# Patient Record
Sex: FEMALE | Race: WHITE | Employment: OTHER | URBAN - METROPOLITAN AREA
[De-identification: names, ages, dates, MRNs, and addresses within clinical notes are randomized per-mention and may not be internally consistent; named-entity substitution may affect disease eponyms.]

---

## 2017-08-22 ENCOUNTER — TELEPHONE (OUTPATIENT)
Dept: INTERNAL MEDICINE CLINIC | Age: 62
End: 2017-08-22

## 2017-08-22 DIAGNOSIS — H04.123 DRY EYES: Primary | ICD-10-CM

## 2017-08-22 NOTE — TELEPHONE ENCOUNTER
----- Message from Lexi Odom sent at 8/22/2017 10:06 AM EDT -----  Regarding: /Telephone  Pt needs referral for appt that she had on 08/14/2017 for [facility] (P) [phone] and (f) [phone].

## 2017-11-15 ENCOUNTER — HOSPITAL ENCOUNTER (OUTPATIENT)
Dept: MAMMOGRAPHY | Age: 62
Discharge: HOME | End: 2017-11-15
Attending: OBSTETRICS & GYNECOLOGY
Payer: COMMERCIAL

## 2017-11-15 DIAGNOSIS — Z12.39 BREAST CANCER SCREENING: ICD-10-CM

## 2017-11-15 PROCEDURE — 77063 BREAST TOMOSYNTHESIS BI: CPT

## 2017-11-18 NOTE — PROGRESS NOTES
MMG normal.  Please update chart per 1/16 protocol.   Overdue for AE/breast exam?  pls make appt w patient

## 2017-11-28 ENCOUNTER — TELEPHONE (OUTPATIENT)
Dept: OBGYN CLINIC | Age: 62
End: 2017-11-28

## 2017-11-28 NOTE — TELEPHONE ENCOUNTER
LMTCB    ----- Message from Halley Lozano MD sent at 11/18/2017  3:08 PM EST -----  MMG normal.  Please update chart per 1/16 protocol.   Overdue for AE/breast exam?  pls make appt w patient

## 2017-12-05 ENCOUNTER — OFFICE VISIT (OUTPATIENT)
Dept: INTERNAL MEDICINE CLINIC | Age: 62
End: 2017-12-05

## 2017-12-05 VITALS
HEART RATE: 69 BPM | HEIGHT: 64 IN | SYSTOLIC BLOOD PRESSURE: 117 MMHG | WEIGHT: 142.2 LBS | BODY MASS INDEX: 24.28 KG/M2 | RESPIRATION RATE: 14 BRPM | DIASTOLIC BLOOD PRESSURE: 67 MMHG | OXYGEN SATURATION: 99 % | TEMPERATURE: 97.9 F

## 2017-12-05 DIAGNOSIS — Z23 ENCOUNTER FOR IMMUNIZATION: ICD-10-CM

## 2017-12-05 DIAGNOSIS — Z00.00 ROUTINE GENERAL MEDICAL EXAMINATION AT A HEALTH CARE FACILITY: Primary | ICD-10-CM

## 2017-12-05 NOTE — PROGRESS NOTES
HISTORY OF PRESENT ILLNESS  Yuridia Sims is a 60 y.o. female. HPI   Patient reports she followed up for mammogram. She notes last colonoscopy was 2014, followed by Dr. Joseph Moyer. She states she needs to follow up for GYN exam.   Patient continues to participate in exercise with water aerobics. Her youngest son graduated from [employer] and is in [place], oldest son is about to have fifth child ( four girls and one boy) and middle son in [place] and dating someone - the love [place] and going for [employer]   Review of Systems   All other systems reviewed and are negative. Physical Exam   Constitutional: She is oriented to person, place, and time. She appears well-developed and well-nourished. HENT:   Head: Normocephalic and atraumatic. Right Ear: External ear normal.   Left Ear: External ear normal.   Nose: Nose normal.   Mouth/Throat: Oropharynx is clear and moist.   Eyes: Conjunctivae and EOM are normal.   Neck: Normal range of motion. Neck supple. Carotid bruit is not present. No thyroid mass and no thyromegaly present. Cardiovascular: Normal rate, regular rhythm, S1 normal, S2 normal, normal heart sounds and intact distal pulses. Pulmonary/Chest: Effort normal and breath sounds normal.   Abdominal: Soft. Normal appearance and bowel sounds are normal. There is no hepatosplenomegaly. There is no tenderness. Musculoskeletal: Normal range of motion. Neurological: She is alert and oriented to person, place, and time. She has normal strength. No cranial nerve deficit or sensory deficit. Coordination normal.   Skin: Skin is warm, dry and intact. No abrasion and no rash noted. Psychiatric: She has a normal mood and affect. Her behavior is normal. Judgment and thought content normal.   Nursing note and vitals reviewed. ASSESSMENT and PLAN  Diagnoses and all orders for this visit:    1.  Routine general medical examination at a health care facility  -     CBC W/O DIFF  -     METABOLIC PANEL, COMPREHENSIVE  -     LIPID PANEL  -     TSH 3RD GENERATION    2. Encounter for immunization  Administered flu vaccine today in office.    -     Influenza virus vaccine (QUADRIVALENT PRES FREE SYRINGE) IM ([id number])    lab results and schedule of future lab studies reviewed with patient  reviewed diet, exercise and weight control    Written by Cari Villegas, as dictated by Yasmine Valenzuela MD.     Current diagnosis and concerns discussed with pt at length. Understands risks and benefits or current treatment plan and medications and accepts the treatment and medication with any possible risks. Pt asks appropriate questions which were answered. Pt instructed to call with any concerns or problems.

## 2017-12-09 LAB
ALBUMIN SERPL-MCNC: 4.4 G/DL (ref 3.6–4.8)
ALBUMIN/GLOB SERPL: 2.1 {RATIO} (ref 1.2–2.2)
ALP SERPL-CCNC: 56 IU/L (ref 39–117)
ALT SERPL-CCNC: 11 IU/L (ref 0–32)
AST SERPL-CCNC: 19 IU/L (ref 0–40)
BILIRUB SERPL-MCNC: 0.5 MG/DL (ref 0–1.2)
BUN SERPL-MCNC: 16 MG/DL (ref 8–27)
BUN/CREAT SERPL: 20 (ref 12–28)
CALCIUM SERPL-MCNC: 9.4 MG/DL (ref 8.7–10.3)
CHLORIDE SERPL-SCNC: 101 MMOL/L (ref 96–106)
CHOLEST SERPL-MCNC: 224 MG/DL (ref 100–199)
CO2 SERPL-SCNC: 21 MMOL/L (ref 18–29)
CREAT SERPL-MCNC: 0.8 MG/DL (ref 0.57–1)
ERYTHROCYTE [DISTWIDTH] IN BLOOD BY AUTOMATED COUNT: 13.4 % (ref 12.3–15.4)
GFR SERPLBLD CREATININE-BSD FMLA CKD-EPI: 79 ML/MIN/1.73
GFR SERPLBLD CREATININE-BSD FMLA CKD-EPI: 91 ML/MIN/1.73
GLOBULIN SER CALC-MCNC: 2.1 G/DL (ref 1.5–4.5)
GLUCOSE SERPL-MCNC: 84 MG/DL (ref 65–99)
HCT VFR BLD AUTO: 39.4 % (ref 34–46.6)
HDLC SERPL-MCNC: 91 MG/DL
HGB BLD-MCNC: 13.5 G/DL (ref 11.1–15.9)
INTERPRETATION, 910389: NORMAL
LDLC SERPL CALC-MCNC: 122 MG/DL (ref 0–99)
MCH RBC QN AUTO: 29.7 PG (ref 26.6–33)
MCHC RBC AUTO-ENTMCNC: 34.3 G/DL (ref 31.5–35.7)
MCV RBC AUTO: 87 FL (ref 79–97)
PLATELET # BLD AUTO: 182 X10E3/UL (ref 150–379)
POTASSIUM SERPL-SCNC: 4.4 MMOL/L (ref 3.5–5.2)
PROT SERPL-MCNC: 6.5 G/DL (ref 6–8.5)
RBC # BLD AUTO: 4.54 X10E6/UL (ref 3.77–5.28)
SODIUM SERPL-SCNC: 139 MMOL/L (ref 134–144)
TRIGL SERPL-MCNC: 53 MG/DL (ref 0–149)
TSH SERPL DL<=0.005 MIU/L-ACNC: 3.51 UIU/ML (ref 0.45–4.5)
VLDLC SERPL CALC-MCNC: 11 MG/DL (ref 5–40)
WBC # BLD AUTO: 4 X10E3/UL (ref 3.4–10.8)

## 2017-12-11 NOTE — PROGRESS NOTES
Patient reviewed mammo results with PCP. Was advised to contact our office for pelvic exam.  Letter sent to patient.    FS updated

## 2018-01-03 ENCOUNTER — OFFICE VISIT (OUTPATIENT)
Dept: OBGYN CLINIC | Age: 63
End: 2018-01-03

## 2018-01-03 VITALS
WEIGHT: 141.6 LBS | DIASTOLIC BLOOD PRESSURE: 68 MMHG | BODY MASS INDEX: 24.17 KG/M2 | HEIGHT: 64 IN | SYSTOLIC BLOOD PRESSURE: 112 MMHG

## 2018-01-03 DIAGNOSIS — Z01.411 ENCOUNTER FOR GYNECOLOGICAL EXAMINATION (GENERAL) (ROUTINE) WITH ABNORMAL FINDINGS: Primary | ICD-10-CM

## 2018-01-03 DIAGNOSIS — N81.10 PROLAPSE OF ANTERIOR VAGINAL WALL: ICD-10-CM

## 2018-01-03 DIAGNOSIS — R32 URINARY INCONTINENCE, UNSPECIFIED TYPE: ICD-10-CM

## 2018-01-03 NOTE — MR AVS SNAPSHOT
Visit Information Date & Time Provider Department Dept. Phone Encounter #  
 1/3/2018  2:10 PM MD Levi Nevarez [phone] Your Appointments 1/3/2018  2:10 PM  
ESTABLISHED PATIENT with MD Levi Nevarez ([address]) Appt Note: Annual Exam  
 [facility] [address] [phone] [id number]  
[facility] [address] Upcoming Health Maintenance Date Due  
 BREAST CANCER SCRN MAMMOGRAM 11/15/2018 COLONOSCOPY 2/17/2021 PAP AKA CERVICAL CYTOLOGY 8/4/2021 Allergies as of 1/3/2018  Review Complete On: 1/3/2018 By: Monica Ambrose LPN No Known Allergies Current Immunizations  Never Reviewed Name Date Influenza Vaccine 9/1/2014 Influenza Vaccine (Quad) PF 12/5/2017 Not reviewed this visit Vitals BP Height(growth percentile) Weight(growth percentile) BMI OB Status Smoking Status 112/68 (BP 1 Location: Left arm) 5' 4\" (1.626 m) 141 lb 9.6 oz (64.2 kg) 24.31 kg/m2 Postmenopausal Never Smoker BMI and BSA Data Body Mass Index Body Surface Area  
 24.31 kg/m 2 1.7 m 2 Preferred Pharmacy Pharmacy Name Phone [place] [address] [phone] Your Updated Medication List  
  
   
This list is accurate as of: 1/3/18  2:04 PM.  Always use your most recent med list.  
  
  
  
  
 acyclovir 5 % ointment Commonly known as:  ZOVIRAX Apply  to affected area every three (3) hours. EYE OMEGA ADVANTAGE PO Take 2,000 mg by mouth two (2) times a day. FISH  mg Cap Generic drug:  Omega-3 Fatty Acids Take  by mouth. PSYLLIUM HUSK-CALCIUM PO Take  by mouth. Patient Instructions Breast Self-Exam: Care Instructions Your Care Instructions A breast self-exam is when you check your breasts for lumps or changes. This regular exam helps you learn how your breasts normally look and feel. Most breast problems or changes are not because of cancer. Breast self-exam is not a substitute for a mammogram. Having regular breast exams by your doctor and regular mammograms improve your chances of finding any problems with your breasts. Some women set a time each month to do a step-by-step breast self-exam. Other women like a less formal system. They might look at their breasts as they brush their teeth, or feel their breasts once in a while in the shower. If you notice a change in your breast, tell your doctor. Follow-up care is a key part of your treatment and safety. Be sure to make and go to all appointments, and call your doctor if you are having problems. It's also a good idea to know your test results and keep a list of the medicines you take. How do you do a breast self-exam? 
· The best time to examine your breasts is usually one week after your menstrual period begins. Your breasts should not be tender then. If you do not have periods, you might do your exam on a day of the month that is easy to remember. · To examine your breasts: ¨ Remove all your clothes above the waist and lie down. When you are lying down, your breast tissue spreads evenly over your chest wall, which makes it easier to feel all your breast tissue. ¨ Use the pads-not the fingertips-of the 3 middle fingers of your left hand to check your right breast. Move your fingers slowly in small coin-sized circles that overlap. ¨ Use three levels of pressure to feel of all your breast tissue. Use light pressure to feel the tissue close to the skin surface. Use medium pressure to feel a little deeper. Use firm pressure to feel your tissue close to your breastbone and ribs. Use each pressure level to feel your breast tissue before moving on to the next spot. ¨ Check your entire breast, moving up and down as if following a strip from the collarbone to the bra line, and from the armpit to the ribs. Repeat until you have covered the entire breast. 
¨ Repeat this procedure for your left breast, using the pads of the 3 middle fingers of your right hand. · To examine your breasts while in the shower: 
¨ Place one arm over your head and lightly soap your breast on that side. ¨ Using the pads of your fingers, gently move your hand over your breast (in the strip pattern described above), feeling carefully for any lumps or changes. ¨ Repeat for the other breast. 
· Have your doctor inspect anything you notice to see if you need further testing. Where can you learn more? Go to http://brittany-jackie.info/. Enter P148 in the search box to learn more about \"Breast Self-Exam: Care Instructions. \" Current as of: May 12, 2017 Content Version: 11.4 © 2592-5019 [employer]. Care instructions adapted under license by [employer] (which disclaims liability or warranty for this information). If you have questions about a medical condition or this instruction, always ask your healthcare professional. [place] [address] any warranty or liability for your use of this information. Introducing [facility] & HEALTH SERVICES! Dear Andres Anderson: Thank you for requesting a OrSense account. Our records indicate that you already have an active OrSense account. You can access your account anytime at [email or web address] Did you know that you can access your hospital and ER discharge instructions at any time in OrSense? You can also review all of your test results from your hospital stay or ER visit. Additional Information If you have questions, please visit the Frequently Asked Questions section of the OrSense website at [email or web address] Remember, MyChart is NOT to be used for urgent needs. For medical emergencies, dial 911. Now available from your iPhone and Android! Please provide this summary of care documentation to your next provider. Your primary care clinician is listed as Leila Parada. If you have any questions after today's visit, please call [phone].

## 2018-01-03 NOTE — PROGRESS NOTES
[facility]  [email or web address]  [phone]    Harless Cranker, MD, [address] [facility]       Annual Gynecologic Exam:   WWE 60+  Chief Complaint   Patient presents with    Well Woman         Thu Caballero is a 60 y.o. No obstetric history on file. WHITE OR   female who presents for an annual exam.    She does not report additional concerns today. She does want to pursue PT again and occ leaks urine. Sexual history and Contraception:  History   Sexual Activity    Sexual activity: Yes    Partners: Male    Birth control/ protection: None     She does not reports new sexual partner(s) in the last year. Preventive Medicine History:  Her last annual GYN exam was about one year ago. Her most recent Pap smear result: normal was obtained in August 2016. Her most recent HR HPV screen was Negative obtained 8 year(s) ago. She does not have a history of TANNA 2, 3 or cervical cancer. Breast health:  Last mammogram: was normal.   A mammogram was not scheduled for today. Breast cancer family updated: see FH. Bone health: Francine Gone She has not had a bone density scan in the past.   She does not have a history of osteopenia/osteoporosis. Osteoporosis family history updated: see FH.      Past Medical History:   Diagnosis Date    H/O mammogram 11/15/2017    Negative    Hx of mammogram 8/20/2014;08/05/2016    normal; negative    Pap smear for cervical cancer screening 07/28/2010    negative, HPV negative    Pap smear for cervical cancer screening 08/04/2016    negative, HPV negative     Past Surgical History:   Procedure Laterality Date    ENDOSCOPY, COLON, DIAGNOSTIC      2011 normal    HX ORTHOPAEDIC      foot surgeries     Family History   Problem Relation Age of Onset    Heart Disease Mother     Cancer Mother      endometrial    Hypertension Mother     Hypertension Father     Cancer Sister      rectal    Breast Cancer Other 61     paternal cousin , triple neg Social History     Social History    Marital status:      Spouse name: N/A    Number of children: N/A    Years of education: N/A     Occupational History    Not on file. Social History Main Topics    Smoking status: Never Smoker    Smokeless tobacco: Never Used    Alcohol use Yes    Drug use: No    Sexual activity: Yes     Partners: Male     Birth control/ protection: None     Other Topics Concern    Not on file     Social History Narrative       No Known Allergies    Current Outpatient Prescriptions   Medication Sig    Omega-3 Fatty Acids (FISH OIL) 300 mg cap Take  by mouth.  OM3-DHA/EPA/D3/LUTEIN/ZEAZANTH (EYE OMEGA ADVANTAGE PO) Take 2,000 mg by mouth two (2) times a day.  PSYLLIUM HUSK/CALCIUM CARB (PSYLLIUM HUSK-CALCIUM PO) Take  by mouth.  acyclovir (ZOVIRAX) 5 % ointment Apply  to affected area every three (3) hours. No current facility-administered medications for this visit.         Patient Active Problem List   Diagnosis Code    Urinary incontinence R32    Prolapse of anterior vaginal wall N81.10       Review of Systems - History obtained from the patient  Constitutional: negative for weight loss, fever, night sweats  HEENT: negative for hearing loss, earache, congestion, snoring, sorethroat  CV: negative for chest pain, palpitations, edema  Resp: negative for cough, shortness of breath, wheezing  GI: negative for change in bowel habits, abdominal pain, black or bloody stools  : see HPI  MSK: negative for back pain, joint pain, muscle pain  Breast: negative for breast lumps, nipple discharge, galactorrhea  Skin :negative for itching, rash, hives  Neuro: negative for dizziness, headache, confusion, weakness  Psych: negative for anxiety, depression, change in mood  Heme/lymph: negative for bleeding, bruising, pallor    Physical Exam  Visit Vitals    /68 (BP 1 Location: Left arm)    Ht 5' 4\" (1.626 m)    Wt 141 lb 9.6 oz (64.2 kg)    BMI 24.31 kg/m2 Constitutional  · Appearance: well-nourished, well developed, alert, in no acute distress    HENT  · Head and Face: appears normal    Neck  · Inspection/Palpation: normal appearance, no masses or tenderness  · Lymph Nodes: no lymphadenopathy present  · Thyroid: gland size normal, nontender, no nodules or masses present on palpation    Chest  · Respiratory Effort: breathing unlabored  · Auscultation: normal breath sounds    Cardiovascular  · Heart:  · Auscultation: regular rate and rhythm without murmur    Breasts  · Inspection of Breasts: breasts symmetrical, no skin changes, no discharge present, nipple appearance normal, no skin retraction present  · Palpation of Breasts and Axillae: no masses present on palpation, no breast tenderness  · Axillary Lymph Nodes: no lymphadenopathy present    Gastrointestinal  · Abdominal Examination: abdomen non-tender to palpation, normal bowel sounds, no masses present  · Liver and spleen: no hepatomegaly present, spleen not palpable  · Hernias: no hernias identified    Genitourinary  · External Genitalia: normal appearance for age, no discharge present, no tenderness present, no inflammatory lesions present, no masses present, mild atrophy present  · Vagina: normal vaginal vault with anterior laxity  no discharge present, no inflammatory lesions present, no masses present  · Bladder: non-tender to palpation  · Urethra: appears normal  · Cervix: normal   · Uterus: normal size, shape and consistency  · Adnexa: no adnexal tenderness present, no adnexal masses present  · Perineum: perineum within normal limits, no evidence of trauma, no rashes or skin lesions present  · Anus: anus within normal limits, no hemorrhoids present  · Inguinal Lymph Nodes: no lymphadenopathy present    Skin  · General Inspection: no rash, no lesions identified    Neurologic/Psychiatric  · Mental Status:  · Orientation: grossly oriented to person, place and time  · Mood and Affect: mood normal, affect appropriate    Assessment:  60 y.o. No obstetric history on file. for well woman exam  Encounter Diagnoses   Name Primary?  Encounter for gynecological examination (general) (routine) with abnormal findings Yes    Prolapse of anterior vaginal wall     Urinary incontinence, unspecified type        Plan:  The patient was counseled about diet, exercise, healthy lifestyle  We discussed current self breast exam and mammogram recommendations  We discussed current pap smear and HR HPV testing guidelines  We recommend follow up in one year for routine annual gynecologic exam or sooner if needed  We recommend follow up with a primary care physician for any chronic medical problems or non-gynecologic concerns    We discussed calcium/vitamin D/weight bearing exercise and osteoporosis prevention  Handouts were given to the patient  pelvic floor exercises  PT referral  Disc avoiding pelvic strain  Pt defers other intervention but will notify MD if wants to consider pessary/surgery      Folllow up:  [x] return for annual well woman exam in one year or sooner if she is having problems  [] follow up and ultrasound  [] mammogram  [] 6 months  [] 6 weeks   []     Orders Placed This Encounter    REFERRAL TO PHYSICAL THERAPY       No results found for any visits on 01/03/18.

## 2018-01-03 NOTE — PATIENT INSTRUCTIONS
Breast Self-Exam: Care Instructions  Your Care Instructions    A breast self-exam is when you check your breasts for lumps or changes. This regular exam helps you learn how your breasts normally look and feel. Most breast problems or changes are not because of cancer. Breast self-exam is not a substitute for a mammogram. Having regular breast exams by your doctor and regular mammograms improve your chances of finding any problems with your breasts. Some women set a time each month to do a step-by-step breast self-exam. Other women like a less formal system. They might look at their breasts as they brush their teeth, or feel their breasts once in a while in the shower. If you notice a change in your breast, tell your doctor. Follow-up care is a key part of your treatment and safety. Be sure to make and go to all appointments, and call your doctor if you are having problems. It's also a good idea to know your test results and keep a list of the medicines you take. How do you do a breast self-exam?  · The best time to examine your breasts is usually one week after your menstrual period begins. Your breasts should not be tender then. If you do not have periods, you might do your exam on a day of the month that is easy to remember. · To examine your breasts:  ¨ Remove all your clothes above the waist and lie down. When you are lying down, your breast tissue spreads evenly over your chest wall, which makes it easier to feel all your breast tissue. ¨ Use the pads-not the fingertips-of the 3 middle fingers of your left hand to check your right breast. Move your fingers slowly in small coin-sized circles that overlap. ¨ Use three levels of pressure to feel of all your breast tissue. Use light pressure to feel the tissue close to the skin surface. Use medium pressure to feel a little deeper. Use firm pressure to feel your tissue close to your breastbone and ribs.  Use each pressure level to feel your breast tissue before moving on to the next spot. ¨ Check your entire breast, moving up and down as if following a strip from the collarbone to the bra line, and from the armpit to the ribs. Repeat until you have covered the entire breast.  ¨ Repeat this procedure for your left breast, using the pads of the 3 middle fingers of your right hand. · To examine your breasts while in the shower:  ¨ Place one arm over your head and lightly soap your breast on that side. ¨ Using the pads of your fingers, gently move your hand over your breast (in the strip pattern described above), feeling carefully for any lumps or changes. ¨ Repeat for the other breast.  · Have your doctor inspect anything you notice to see if you need further testing. Where can you learn more? Go to http://brittany-jackie.info/. Enter P148 in the search box to learn more about \"Breast Self-Exam: Care Instructions. \"  Current as of: May 12, 2017  Content Version: 11.4  © 1711-5890 [employer]. Care instructions adapted under license by [employer] (which disclaims liability or warranty for this information). If you have questions about a medical condition or this instruction, always ask your healthcare professional. [place] [address] any warranty or liability for your use of this information.

## 2018-03-22 ENCOUNTER — HOSPITAL ENCOUNTER (OUTPATIENT)
Dept: PHYSICAL THERAPY | Age: 63
Discharge: HOME | End: 2018-03-22
Payer: COMMERCIAL

## 2018-03-22 PROCEDURE — 97162 PT EVAL MOD COMPLEX 30 MIN: CPT | Performed by: PHYSICAL MEDICINE & REHABILITATION

## 2018-03-22 PROCEDURE — 97140 MANUAL THERAPY 1/> REGIONS: CPT | Performed by: PHYSICAL MEDICINE & REHABILITATION

## 2018-03-22 PROCEDURE — 97530 THERAPEUTIC ACTIVITIES: CPT | Performed by: PHYSICAL MEDICINE & REHABILITATION

## 2018-03-26 ENCOUNTER — HOSPITAL ENCOUNTER (OUTPATIENT)
Dept: PHYSICAL THERAPY | Age: 63
Discharge: HOME | End: 2018-03-26
Payer: COMMERCIAL

## 2018-03-26 PROCEDURE — 97112 NEUROMUSCULAR REEDUCATION: CPT | Performed by: PHYSICAL MEDICINE & REHABILITATION

## 2018-03-26 PROCEDURE — 97110 THERAPEUTIC EXERCISES: CPT | Performed by: PHYSICAL MEDICINE & REHABILITATION

## 2018-03-26 NOTE — PROGRESS NOTES
PT DAILY TREATMENT NOTE 2-15    Patient Name: Germán Sims  Date:3/26/2018  : 1955  [x]  Patient  Verified  Payor: Velora Paget / Plan: Fiorella Phlegm / Product Type: HMO /    In time: 2:00 Out time:3:00  Total Treatment Time (min): 60  Visit #: 2     Treatment Area: Cystocele, unspecified [N81.10]  Unspecified urinary incontinence [R32]    SUBJECTIVE  Pain Level (0-10 scale): 0/10  Any medication changes, allergies to medications, adverse drug reactions, diagnosis change, or new procedure performed?: [x] No    [] Yes (see summary sheet for update)  Subjective functional status/changes:   [x] No changes reported      OBJECTIVE      45 min Therapeutic Exercise:  [] See flow sheet :  PFMT uptraining, quality PFM contractions, no valsalva or substitution. Rationale: increase strength, improve coordination and increase proprioception to improve the patients ability to eliminate urine loss with ADLs    15 min Neuromuscular Re-education:  []  See flow sheet : sEMG biofeedback with vaginal sensor. Rationale: increase strength, improve coordination and increase proprioception  to improve the patients ability to eliminate urine loss with ADLS    With   [x] TE   [] TA   [x] neuro   [] other: Patient Education: [x] Review HEP    [x] Progressed/Changed HEP based on:   [] positioning   [] body mechanics   [] transfers   [] heat/ice application    [] other:      Other Objective/Functional Measures:  Initiated sEMGbiofeedback today. 10w Average:  10mV  10r Average:  3mV      Pain Level (0-10 scale) post treatment: 0/10    ASSESSMENT/Changes in Function:    Demonstrates low PFM recruitment, decreased endurance, decreased coordination. Improved performance noted with sEMG biofeedback.      Patient will continue to benefit from skilled PT services to modify and progress therapeutic interventions, address strength deficits, analyze and modify body mechanics/ergonomics and assess and modify postural abnormalities to attain remaining goals. [x]  See Plan of Care  []  See progress note/recertification  []  See Discharge Summary         Progress towards goals / Updated goals:  Able to advance exercises today with good tolerance. Pt continues to need instruction for correct exercise form and performance. Continues to demonstrate good potential to achieve functional goals stated on the plan of care.       PLAN  [x]  Upgrade activities as tolerated     []  Continue plan of care  []  Update interventions per flow sheet       []  Discharge due to:_  []  Other:_      Ronald Roa PT, MSPT   3/26/2018  2:10 PM

## 2018-03-26 NOTE — PROGRESS NOTES
[address] [facility], [address] [place] [address] Drive  Phone: [phone]  Fax: [phone]    Plan of Care/ Statement of Necessity for Physical Therapy Services 2-15    Patient name: Sreedhar Mello  : 1955  Provider#: [id number]  Referral source: Irma Moss MD      Medical/Treatment Diagnosis: Cystocele, unspecified [N81.10]  Unspecified urinary incontinence [R32]     Prior Hospitalization: see medical history     Comorbidities: B bunionectomies, thumb fusion  Prior Level of Function: no urine loss with ADLs  Medications: Verified on Patient Summary List    Start of Care: 3/22/18      Onset Date: 5 years      The Plan of Care and following information is based on the information from the initial evaluation. Assessment/ key information:  Patient is a 60year old female with complaints of urinary urge, urge incontinence and stress incontinence that started 5 years ago, worse recently. She does not wear protection. She reports she hydrates a lot through the day, drinking 4+ glasses of decaf tea, 4+ glasses of water. She can go 2-3 hours without urinating. She wakes several times a night with the urge to urinate 1-2x but states that she \"fights through it\" and stays in bed. When she wakes in the morning she has to run to the bathroom. Patient presents with mild cystocyle and rectocyle, increased bladder neck mobility with valsalva. Pelvic floor muscles are weak, poorly coordinated and have low endurance with performance testing. There is a mild pelvic obliquity which may be contributory.  She will benefit from skilled PT services to address her limitations and achieve her functional goals as stated on the plan of care.        Evaluation Complexity History MEDIUM  Complexity : 1-2 comorbidities / personal factors will impact the outcome/ POC ; Examination MEDIUM Complexity : 3 Standardized tests and measures addressing body structure, function, activity limitation and / or participation in recreation  ;Presentation MEDIUM Complexity : Evolving with changing characteristics  ; Clinical Decision Making MEDIUM Complexity : FOTO score of 26-74  Overall Complexity Rating: MEDIUM    Problem List: decrease strength, decrease ADL/ functional abilitiies, decrease activity tolerance and other urinary urge and stress incontinence   Treatment Plan may include any combination of the following: Therapeutic exercise, Therapeutic activities, Neuromuscular re-education, Physical agent/modality, Manual therapy, Patient education and Self Care training  Patient / Family readiness to learn indicated by: asking questions  Persons(s) to be included in education: patient (P)  Barriers to Learning/Limitations: None  Patient Goal (s): no loss of urine with ADLs  Patient Self Reported Health Status: excellent  Rehabilitation Potential: excellent    Short Term Goals: To be accomplished in 6 weeks:  Patient will be independent with a progressive home exercise program    Patient will demonstrate & utilized pelvic floor ms protection techniques   Patient will demonstrate improved PFM strength to 4/5 bilaterally in order to decrease DOT symptoms   Patient will complete a 72 hour bladder diary    Patient will be independent with urge suppression techniques, bladder irritant elmination     Long Term Goals: To be accomplished in 12 weeks:  Patient will demonstrate 4+/5 PFM strength bilaterally in order to eliminate DOT symptoms. Patient will demonstrate 10 second PFM holds at 4/5 in order to elminate DOT symptoms. Patient will have no loss of urine with cough/sneeze. Patient will have no loss of urine with urge triggers (key in door, pulling pants down)    Frequency / Duration: Patient to be seen 1-2 times per week for 6-12 weeks.     Patient/ Caregiver education and instruction: self care, activity modification and exercises    Dipak Carrasquillo PT, MSPT    3/26/2018 10:11 AM    ________________________________________________________________________    I certify that the above Therapy Services are being furnished while the patient is under my care. I agree with the treatment plan and certify that this therapy is necessary.     [de-identified] Signature:____________________  Date:____________Time: _________

## 2018-03-26 NOTE — PROGRESS NOTES
PT INITIAL EVALUATION NOTE 2-15    Patient Name: Niko Sims  Date:3/22/2018  : 1955  [x]  Patient  Verified  Payor: BLUE CROSS / Plan: [address] [place] [address] / Product Type: PPO /    In time: 12:30   Out time: 1:30  Total Treatment Time (min): 60  Visit #: 1    Treatment Area: Cystocele, unspecified [N81.10]  Unspecified urinary incontinence [R32]    SUBJECTIVE  Pain Level (0-10 scale): 0/10  Any medication changes, allergies to medications, adverse drug reactions, diagnosis change, or new procedure performed?: [x] No    [] Yes (see summary sheet for update)  Subjective functional status/changes:   [] No changes reported    Patient is a 60year old female with complaints of urinary urge, urge incontinence and stress incontinence that started 5 years ago, worse recently. She does not wear protection. She reports she hydrates a lot through the day, drinking 4+ glasses of decaf tea, 4+ glasses of water. She can go 2-3 hours without urinating. She wakes several times a night with the urge to urinate 1-2x but states that she \"fights through it\" and stays in bed. When she wakes in the morning she has to run to the bathroom. She takes psyllium every day due to diverticulitis. She rates her stool as a 4-5 on the [facility] scale, she has a BM daily. 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    Trunk Flexion: Patient able to touch palms to floor, no difficulty return to upright  Stork Test (-)  Squat Test (-)  In supine pt presents with posterior L ASIS, elevated and posterior pubic rami  Global joint hypermobility noted throughout     Pelvic Exam:   No tenderness through pelvic clock externally  Short perineum  No POP at rest   There is a Grade 2 rectocyle visible with 6 second sustained valsalva  No perineal descent at rest or with valsalva  There is no visible PFM contraction externally with cues to \"do a kegal\"    Internal Pelvic Exam:   There is palpable cystocyle with valsalva  (+) bladder neck hypermobility noted with valsalva  (-) levator ani avulsion   (-) levator ani trigger points bilaterally  There is palpable scar at 4:00, mild tenderness to stretch pressure    PERFECT SCORE CHART  P =  Power (Laycock Scale Grade 0-5)  -2/5 There is weak squeeze, no lift  E =  Endurance (How long can pt hold max contraction) Holds 2 seconds max   R =  Repetitions (How many times the pt can repeat the holds) Not tested due to low P and E  F =  Fast Twitch (Number of 1 second contractions in 10 seconds) Repeats 4 times in 10 seconds  E =  Elevation (Lifting of post vag wall toward pubic bone) Present/Absent Absent  C =  Co-Contraction of transverse abdominus Present/Absent Present (heavy substitution)  T = Timing (squeeze and lift with cough) Present/Absent Squeeze with cough  * Decreased PFM coordination between contractions noted. 30 min Therapeutic Activity:  [x]  See flow sheet : Pt instructed in pelvic floor anatomy including levator ani, puborectalis musculature. Provided dietary information to improve stool quality including increasing soluble fiber intake (psyllium), probiotics (Activia yogurt) and increased water intake (6-8 glasses a day). Patient educated in urge suppression techniques including quick flicks, walking to the toilet, nervous system down training. Patient educated in proper defecation posture and technique including use of Squatty Potty for better puborectalis ms relaxation.   Pt to avoid straining to pass stool in order to decrease strain on pelvic floor. Rationale:  to improve the patients ability to control symptoms     15 min Neuromuscular Re-education:  []  See flow sheet :  PFMT with manual cues- internal   Rationale: increase strength, improve coordination and increase proprioception  to improve the patients ability to eliminate urine loss with ADLs    With   [x] TE   [] TA   [] neuro   [] other: Patient Education: [x] Review HEP    [] Progressed/Changed HEP based on:   [] positioning   [] body mechanics   [] transfers   [] heat/ice application    [] other:      Other Objective/Functional Measures: --     Pain Level (0-10 scale) post treatment: 0/10    ASSESSMENT/Changes in Function:  Patient presents with mild cystocyle and rectocyle, increased bladder neck mobility with valsalva. Pelvic floor muscles are weak, poorly coordinated and have low endurance with performance testing. There is a mild pelvic obliquity which may be contributory. She will benefit from skilled PT services to address her limitations and achieve her functional goals as stated on the plan of care.        [x]  See Plan of Care  []  See progress note/recertification  []  See Discharge Summary         PLAN  [x]  Upgrade activities as tolerated     []  Continue plan of care  []  Update interventions per flow sheet       []  Discharge due to:_  []  Other:_      Jean Claude Norris, PT, MSPT  3/22/2018  12:45 PM

## 2018-03-29 ENCOUNTER — HOSPITAL ENCOUNTER (OUTPATIENT)
Dept: PHYSICAL THERAPY | Age: 63
Discharge: HOME | End: 2018-03-29
Payer: COMMERCIAL

## 2018-03-29 PROCEDURE — 97110 THERAPEUTIC EXERCISES: CPT | Performed by: PHYSICAL MEDICINE & REHABILITATION

## 2018-03-29 PROCEDURE — 97112 NEUROMUSCULAR REEDUCATION: CPT | Performed by: PHYSICAL MEDICINE & REHABILITATION

## 2018-03-29 NOTE — PROGRESS NOTES
PT DAILY TREATMENT NOTE 2-15    Patient Name: Alyse Sims  Date:3/29/2018  : 1955  [x]  Patient  Verified  Payor: Jordana Peña / Plan: Abram Vance / Product Type: HMO /    In time: 2:00  Out time: 3:00  Total Treatment Time (min): 60  Visit #: 3     Treatment Area: Cystocele, unspecified [N81.10]  Unspecified urinary incontinence [R32]    SUBJECTIVE  Pain Level (0-10 scale): 10   L SIJ area pain  Any medication changes, allergies to medications, adverse drug reactions, diagnosis change, or new procedure performed?: [x] No    [] Yes (see summary sheet for update)  Subjective functional status/changes:   [] No changes reported  Has some increased L SIJ pain after sitting a lot yesterday and increased activity today. OBJECTIVE  Patient indicates pain directly over L SIJ  L posterior innominate in supine, elevated L pub rami. Pelvic obliquity   SIJ Pain reproduced with standing stork L      45 min Therapeutic Exercise:  [x] See flow sheet : MET L SIJ correction, instruction in L SIJ self correction technique. PFMT in supine hook lying position. Rationale: increase strength, improve coordination and increase proprioception to improve the patients ability to eliminate urine loss with ADLs     15 min Neuromuscular Re-education:  [x]  See flow sheet : sEMG biofeedback vaginal.  High quality PFMC's with no substitution   Rationale: increase strength, improve coordination and increase proprioception  to improve the patients ability to eliminate urine loss with ADLs         With   [x] TE   [] TA   [] neuro   [] other: Patient Education: [x] Review HEP    [x] Progressed/Changed HEP based on:   [x] positioning   [] body mechanics   [] transfers   [] heat/ice application    [x] other:  MET L posterior innominate SIJ self correction technique     Other Objective/Functional Measures:   Able to perform L stork painfree post treatment today. Pelvic obliquity reduced.       Pain Level (0-10 scale) post treatment: 0/10    ASSESSMENT/Changes in Function:  L SIJ pain resolved post treatment today. Demonstrates improved PFMC with sEMG biofeedback, she is more confident with PFMC HEP, demonstrates good carry over of instruction from clinic to home. Patient will continue to benefit from skilled PT services to modify and progress therapeutic interventions, address functional mobility deficits, address ROM deficits, address strength deficits, analyze and address soft tissue restrictions, analyze and cue movement patterns, analyze and modify body mechanics/ergonomics and assess and modify postural abnormalities to attain remaining goals. [x]  See Plan of Care  []  See progress note/recertification  []  See Discharge Summary         Progress towards goals / Updated goals:  Able to advance exercises today with good tolerance. Pt continues to need instruction for correct exercise form and performance. Continues to demonstrate good potential to achieve functional goals stated on the plan of care.       PLAN  [x]  Upgrade activities as tolerated     []  Continue plan of care  []  Update interventions per flow sheet       []  Discharge due to:_  []  Other:_      Laurie Walters, PT, MSPT  3/29/2018  2:09 PM

## 2018-04-02 ENCOUNTER — HOSPITAL ENCOUNTER (OUTPATIENT)
Dept: PHYSICAL THERAPY | Age: 63
Discharge: HOME | End: 2018-04-02
Payer: COMMERCIAL

## 2018-04-02 PROCEDURE — 97110 THERAPEUTIC EXERCISES: CPT | Performed by: PHYSICAL MEDICINE & REHABILITATION

## 2018-04-02 PROCEDURE — 97112 NEUROMUSCULAR REEDUCATION: CPT | Performed by: PHYSICAL MEDICINE & REHABILITATION

## 2018-04-02 NOTE — PROGRESS NOTES
PT DAILY TREATMENT NOTE 2-15    Patient Name: Jayla Gloria  Date:2018  : 1955  [x]  Patient  Verified  Payor: Mina Quiñones / Plan: [address] [place] [address] / Product Type: PPO /    In time: 11:00  Out time: 12:00  Total Treatment Time (min): 60  Visit #: 4     Treatment Area: Cystocele, unspecified [N81.10]  Unspecified urinary incontinence [R32]    SUBJECTIVE  Pain Level (0-10 scale): Any medication changes, allergies to medications, adverse drug reactions, diagnosis change, or new procedure performed?: [x] No    [] Yes (see summary sheet for update)  Subjective functional status/changes:   [] No changes reported  No leaking since last visit. She is having 1-2 BM per day rated [place] 4, attributes this to the [place] [employer]  \"like a miracle!'   SIJ pain remained settled after last visit. She did self correction technique at home with good results. OBJECTIVE      45 min Therapeutic Exercise:  [x] See flow sheet : Reviewed MET self correction technique, demonstrates good form. PFMT in supine, sitting, standing flexed and upright positions. Rationale: increase strength and improve coordination to improve the patients ability to eliminate urinary incontinence with ADLs     15 min Neuromuscular Re-education:  [x]  See flow sheet : sEMG biofeedback vaginal sensor   Rationale: increase strength, improve coordination and increase proprioception  to improve the patients ability to eliminate urinary incontinence with ADLs    With   [x] TE   [] TA   [] neuro   [] other: Patient Education: [x] Review HEP    [x] Progressed/Changed HEP based on:   [] positioning   [] body mechanics   [] transfers   [] heat/ice application    [x] other:  Advanced PFMT HEP to sitting and standing. Added hip adduction and abduction with resistance. Other Objective/Functional Measures: Demonstrated improved PFMC in upright positions.      Pain Level (0-10 scale) post treatment: 0/10    ASSESSMENT/Changes in Function:   Pt reports improved stool quality with decreased straining to pass stool, decreasing strain on pelvic floor musculature. She is progressing with PFMT in more functional positions. Pt is compliant with HEP. Patient will continue to benefit from skilled PT services to modify and progress therapeutic interventions, address functional mobility deficits, address ROM deficits, address strength deficits, analyze and address soft tissue restrictions, analyze and cue movement patterns, analyze and modify body mechanics/ergonomics and assess and modify postural abnormalities to attain remaining goals. [x]  See Plan of Care  []  See progress note/recertification  []  See Discharge Summary         Progress towards goals / Updated goals:  Able to advance exercises today with good tolerance. Pt continues to need instruction for correct exercise form and performance. Continues to demonstrate good potential to achieve functional goals stated on the plan of care.       PLAN  [x]  Upgrade activities as tolerated     []  Continue plan of care  []  Update interventions per flow sheet       []  Discharge due to:_  []  Other:_      Leo Chu, PT, MSPT   4/2/2018  11:23 AM

## 2018-04-04 ENCOUNTER — HOSPITAL ENCOUNTER (OUTPATIENT)
Dept: PHYSICAL THERAPY | Age: 63
Discharge: HOME | End: 2018-04-04
Payer: COMMERCIAL

## 2018-04-04 PROCEDURE — 97110 THERAPEUTIC EXERCISES: CPT | Performed by: PHYSICAL MEDICINE & REHABILITATION

## 2018-04-04 PROCEDURE — 97112 NEUROMUSCULAR REEDUCATION: CPT | Performed by: PHYSICAL MEDICINE & REHABILITATION

## 2018-04-04 NOTE — PROGRESS NOTES
PT DAILY TREATMENT NOTE 2-15    Patient Name: Whit Pimentel  Date:2018  : 1955  [x]  Patient  Verified  Payor: Celio Douglas / Plan: [address] [place] [address] / Product Type: PPO /    In time: 9:00  Out time: 10:00  Total Treatment Time (min): 60  Visit #: 5     Treatment Area: Cystocele, unspecified [N81.10]  Unspecified urinary incontinence [R32]    SUBJECTIVE  Pain Level (0-10 scale): 0/10  Any medication changes, allergies to medications, adverse drug reactions, diagnosis change, or new procedure performed?: [x] No    [] Yes (see summary sheet for update)  Subjective functional status/changes:   [] No changes reported  Has been doing exercises daily with good tolerance, no leaking over the past week. SIJ remains stable, self correction techniques are helping. She will be away x 1 week, will be doing a lot of walking and hiking. Curious to see how she does with this. OBJECTIVE    45 min Therapeutic Exercise:  [x] See flow sheet :  PFMT in supine, standing flexed, standing upright and with TrAB exercises standing at the wall. Hip strengthening exercises per flow sheet. Rationale: increase strength and improve coordination to improve the patients ability to eliminate urinary incontinence with functional tasks. 15 min Neuromuscular Re-education:  [x]  See flow sheet :sEMG biofeedback with PFMT, high quality PFMC focus   Rationale: increase strength, improve coordination and increase proprioception  to improve the patients ability to eliminate urinary incontinence with functional tasks          With   [x] TE   [] TA   [] neuro   [] other: Patient Education: [x] Review HEP    [x] Progressed/Changed HEP based on:   [x] positioning   [] body mechanics   [] transfers   [] heat/ice application    [x] other:  Advanced HEP, see copy in chart. Other Objective/Functional Measures:  Note improved PFMC with standing positions on sEMG.        Pain Level (0-10 scale) post treatment: 0/10 ASSESSMENT/Changes in Function:   Progressing well with improved urinary control with ADLs, improved SIJ stability, increased functional tolerance. Able to advance exercises today with good tolerance. She will be away x 1 week, will resume PT and reassess upon return. Patient will continue to benefit from skilled PT services to modify and progress therapeutic interventions, address functional mobility deficits, address strength deficits, analyze and cue movement patterns, analyze and modify body mechanics/ergonomics and assess and modify postural abnormalities to attain remaining goals. [x]  See Plan of Care  []  See progress note/recertification  []  See Discharge Summary         Progress towards goals / Updated goals:  Able to advance exercises today with good tolerance. Pt continues to need instruction for correct exercise form and performance. Continues to demonstrate good potential to achieve functional goals stated on the plan of care.       PLAN  [x]  Upgrade activities as tolerated     []  Continue plan of care  []  Update interventions per flow sheet       []  Discharge due to:_  []  Other:_      Leo Chu, PT, MSPT   4/4/2018  10:16 AM

## 2018-04-12 ENCOUNTER — HOSPITAL ENCOUNTER (OUTPATIENT)
Dept: PHYSICAL THERAPY | Age: 63
Discharge: HOME | End: 2018-04-12
Payer: COMMERCIAL

## 2018-04-12 PROCEDURE — 97110 THERAPEUTIC EXERCISES: CPT | Performed by: PHYSICAL MEDICINE & REHABILITATION

## 2018-04-12 PROCEDURE — 97112 NEUROMUSCULAR REEDUCATION: CPT | Performed by: PHYSICAL MEDICINE & REHABILITATION

## 2018-04-12 NOTE — PROGRESS NOTES
PT DAILY TREATMENT NOTE 2-15    Patient Name: Gabo Sims  Date:2018  : 1955  [x]  Patient  Verified  Payor: Jada Carbajal / Plan: [address] [place] [address] / Product Type: PPO /    In time: 1:00  Out time: 2:00  Total Treatment Time (min): 60  Visit #: 6     Treatment Area: Cystocele, unspecified [N81.10]  Unspecified urinary incontinence [R32]    SUBJECTIVE  Pain Level (0-10 scale): 0/10  Any medication changes, allergies to medications, adverse drug reactions, diagnosis change, or new procedure performed?: [x] No    [] Yes (see summary sheet for update)  Subjective functional status/changes:   [] No changes reported  Was out of town x 1 week, did her exercises while gone. Noticing improvements in urge and stress. She is continuing with exercises daily. Had one urge loss of urine last week. OBJECTIVE    45 min Therapeutic Exercise:  [x] See flow sheet :  PFMT up and down training in various positions. Core strengthening exs per flow sheet    Rationale: increase strength, improve coordination and increase proprioception to improve the patients ability to eliminate urinary incontinence     15 min Neuromuscular Re-education:  [x]  See flow sheet : sEMG biofeedback training - vaginal.   Reviewed urge suppression techniques. Rationale: increase strength, improve coordination and increase proprioception  to improve the patients ability to eliminate urinary incontinence.           With   [x] TE   [] TA   [] neuro   [] other: Patient Education: [x] Review HEP    [] Progressed/Changed HEP based on:   [] positioning   [] body mechanics   [] transfers   [] heat/ice application    [x] other:  Provided [employer] Pilates DVD information     Other Objective/Functional Measures:   Average 10s Work: 10.6mV  Average 10s Rest: 3.14mV   Work Rest Rise: +7.46mV    Pain Level (0-10 scale) post treatment: 0/10    ASSESSMENT/Changes in Function:     Patient will continue to benefit from skilled PT services to modify and progress therapeutic interventions, address strength deficits, analyze and address soft tissue restrictions, analyze and cue movement patterns, analyze and modify body mechanics/ergonomics and assess and modify postural abnormalities to attain remaining goals. [x]  See Plan of Care  []  See progress note/recertification  []  See Discharge Summary         Progress towards goals / Updated goals:  Able to advance exercises today with good tolerance. Pt continues to need instruction for correct exercise form and performance. Continues to demonstrate good potential to achieve functional goals stated on the plan of care.       PLAN  [x]  Upgrade activities as tolerated     []  Continue plan of care  []  Update interventions per flow sheet       []  Discharge due to:_  []  Other:_      Leo Chu PT, MSPT 4/12/2018  1:11 PM

## 2018-04-16 ENCOUNTER — APPOINTMENT (OUTPATIENT)
Dept: PHYSICAL THERAPY | Age: 63
End: 2018-04-16
Payer: COMMERCIAL

## 2018-04-23 ENCOUNTER — HOSPITAL ENCOUNTER (OUTPATIENT)
Dept: PHYSICAL THERAPY | Age: 63
Discharge: HOME | End: 2018-04-23
Payer: COMMERCIAL

## 2018-04-23 PROCEDURE — 97110 THERAPEUTIC EXERCISES: CPT | Performed by: PHYSICAL MEDICINE & REHABILITATION

## 2018-04-23 PROCEDURE — 97112 NEUROMUSCULAR REEDUCATION: CPT | Performed by: PHYSICAL MEDICINE & REHABILITATION

## 2018-04-23 NOTE — PROGRESS NOTES
PT DAILY TREATMENT NOTE 2-15    Patient Name: Kristina Sims  Date:2018  : 1955  [x]  Patient  Verified  Payor: Everett [employer] / Plan: [address] [place] [address] / Product Type: PPO /    In time: 1:00  Out time: 2:00  Total Treatment Time (min): 60  Visit #: 7     Treatment Area: Cystocele, unspecified [N81.10]  Unspecified urinary incontinence [R32]    SUBJECTIVE  Pain Level (0-10 scale): 0/10  Any medication changes, allergies to medications, adverse drug reactions, diagnosis change, or new procedure performed?: [x] No    [] Yes (see summary sheet for update)  Subjective functional status/changes:   [] No changes reported  Notices better control especially with urge. Overall much more confident with ADLs  She is doing HEP daily. Feels about 50% improved since starting PT.       OBJECTIVE    45 min Therapeutic Exercise:  [] See flow sheet :  PFMT in various positions   Rationale: increase strength and improve coordination to improve the patients ability to eliminate urine loss with ADLs    15 min Neuromuscular Re-education:  []  See flow sheet :   Rationale: increase strength, improve coordination and increase proprioception  to improve the patients ability to eliminate urine loss with ADLs         With   [x] TE   [] TA   [] neuro   [] other: Patient Education: [x] Review HEP    [] Progressed/Changed HEP based on:   [] positioning   [] body mechanics   [] transfers   [] heat/ice application    [] other:      Other Objective/Functional Measures:    sEMG biofeedback- vaginal sensor  10s Work Average 12mV  Max 20mV    Improved from initial evaluation, goal is average 30mV  10s Rest Average 3mV (WNL)     Pain Level (0-10 scale) post treatment: 0/10    ASSESSMENT/Changes in Function:  Making steady gains toward goals.      Patient will continue to benefit from skilled PT services to modify and progress therapeutic interventions, address functional mobility deficits, address ROM deficits, address strength deficits, analyze and modify body mechanics/ergonomics and assess and modify postural abnormalities to attain remaining goals. [x]  See Plan of Care  []  See progress note/recertification  []  See Discharge Summary         Progress towards goals / Updated goals:  Short Term Goals: To be accomplished in 6 weeks:  Patient will be independent with a progressive home exercise program  MET  Patient will demonstrate & utilized pelvic floor ms protection techniques MET   Patient will demonstrate improved PFM strength to 4/5 bilaterally in order to decrease DOT symptoms PROGRESSING  Patient will complete a 72 hour bladder diary  N/A  Patient will be independent with urge suppression techniques, bladder irritant elmination MET     Long Term Goals: To be accomplished in 12 weeks:  Patient will demonstrate 4+/5 PFM strength bilaterally in order to eliminate DOT symptoms. PROGRESSING  Patient will demonstrate 10 second PFM holds at 4/5 in order to elminate DOT symptoms. PROGRESSING  Patient will have no loss of urine with cough/sneeze.   MET  Patient will have no loss of urine with urge triggers (key in door, pulling pants down) MET    PLAN  [x]  Upgrade activities as tolerated     [x]  Continue plan of care  []  Update interventions per flow sheet       []  Discharge due to:_  []  Other:_      Francoise Mooney, PT, MSPT 4/23/2018  1:12 PM

## 2018-04-30 ENCOUNTER — HOSPITAL ENCOUNTER (OUTPATIENT)
Dept: PHYSICAL THERAPY | Age: 63
Discharge: HOME | End: 2018-04-30
Payer: COMMERCIAL

## 2018-04-30 PROCEDURE — 97112 NEUROMUSCULAR REEDUCATION: CPT | Performed by: PHYSICAL MEDICINE & REHABILITATION

## 2018-04-30 PROCEDURE — 97110 THERAPEUTIC EXERCISES: CPT | Performed by: PHYSICAL MEDICINE & REHABILITATION

## 2018-04-30 NOTE — PROGRESS NOTES
PT DAILY TREATMENT NOTE 2-15    Patient Name: Fer Barros  Date:2018  : 1955  [x]  Patient  Verified  Payor: Jordana Peña / Plan: [address] [place] [address] / Product Type: PPO /    In time: 1:10  Out time: 2:00  Total Treatment Time (min): 60  Visit #: 8    Treatment Area: Cystocele, unspecified [N81.10]  Unspecified urinary incontinence [R32]    SUBJECTIVE  Pain Level (0-10 scale):   0/10  Any medication changes, allergies to medications, adverse drug reactions, diagnosis change, or new procedure performed?: [x] No    [] Yes (see summary sheet for update)  Subjective functional status/changes:   [] No changes reported    Continues to do well. Had one episode of leaking that felt like a \"bladder spasm\". OBJECTIVE      45 min Therapeutic Exercise:  [] See flow sheet :  Review HEP hip and trunk strengthening exs, PFMT    Rationale: increase strength, improve coordination and increase proprioception to improve the patients ability to eliminate urinary incontinence    15 min Neuromuscular Re-education:  []  See flow sheet : sEMG biofeedback    Rationale: increase ROM, increase strength and increase proprioception  to improve the patients ability to eliminate urinary incontinence       With   [x] TE   [] TA   [] neuro   [] other: Patient Education: [x] Review HEP    [x] Progressed/Changed HEP based on:   [] positioning   [] body mechanics   [] transfers   [] heat/ice application    [] other:      Other Objective/Functional Measures:    10s Work average: 12.3mV with fatigue.        Pain Level (0-10 scale) post treatment: 0/10    ASSESSMENT/Changes in Function:     Patient will continue to benefit from skilled PT services to modify and progress therapeutic interventions, address functional mobility deficits, address ROM deficits, address strength deficits, analyze and address soft tissue restrictions, analyze and cue movement patterns, analyze and modify body mechanics/ergonomics and assess and modify postural abnormalities to attain remaining goals. [x]  See Plan of Care  []  See progress note/recertification  []  See Discharge Summary         Progress towards goals / Updated goals:  Able to advance exercises today with good tolerance. Pt continues to need instruction for correct exercise form and performance. Continues to demonstrate good potential to achieve functional goals stated on the plan of care.       PLAN  [x]  Upgrade activities as tolerated     []  Continue plan of care  []  Update interventions per flow sheet       []  Discharge due to:_  []  Other:_      Elba Padilla PT, MSPT  4/30/2018  1:20 PM

## 2018-06-06 ENCOUNTER — APPOINTMENT (OUTPATIENT)
Dept: PHYSICAL THERAPY | Age: 63
End: 2018-06-06
Payer: COMMERCIAL

## 2018-06-14 ENCOUNTER — HOSPITAL ENCOUNTER (OUTPATIENT)
Dept: PHYSICAL THERAPY | Age: 63
Discharge: HOME | End: 2018-06-14
Payer: COMMERCIAL

## 2018-06-14 PROCEDURE — 97112 NEUROMUSCULAR REEDUCATION: CPT | Performed by: PHYSICAL MEDICINE & REHABILITATION

## 2018-06-14 PROCEDURE — 97110 THERAPEUTIC EXERCISES: CPT | Performed by: PHYSICAL MEDICINE & REHABILITATION

## 2018-06-14 NOTE — PROGRESS NOTES
PT DAILY TREATMENT NOTE 2-15    Patient Name: Cassidy Sims  Date:2018  : 1955  [x]  Patient  Verified  Payor: Stacey Duval / Plan: [address] [place] [address] / Product Type: PPO /    In time: 0800  Out time:0900  Total Treatment Time (min): 60  Visit #: 9     Treatment Area: Cystocele, unspecified [N81.10]  Unspecified urinary incontinence [R32]    SUBJECTIVE  Pain Level (0-10 scale):  0/10  Any medication changes, allergies to medications, adverse drug reactions, diagnosis change, or new procedure performed?: [x] No    [] Yes (see summary sheet for update)  Subjective functional status/changes:   [] No changes reported  Doing well, feels ready to DC  No incontinence episodes in weeks. Wakes 0-1x per night to urinate, this is a big improvement    OBJECTIVE      45 min Therapeutic Exercise:  [x] See flow sheet :  PFMT drills in supine, sitting, standing and with cough. Rationale: increase strength and improve coordination to improve the patients ability to eliminate UI with ADLs    15 min Neuromuscular Re-education:  [x]  See flow sheet :  sEMG biofeedback with vaginal sensor   Rationale: improve coordination and increase proprioception  to improve the patients ability to eliminate UI with ADLs         With   [x] TE   [] TA   [x] neuro   [] other: Patient Education: [x] Review HEP    [] Progressed/Changed HEP based on:   [] positioning   [] body mechanics   [] transfers   [] heat/ice application    [x] other:   DC HEP reviewed.       Other Objective/Functional Measures:     PERFECT SCORE CHART  P =  Power (Laycock Scale Grade 0-5)    4/5  E =  Endurance (How long can pt hold max contraction)   10  R =  Repetitions (How many times the pt can repeat the holds)   5+  F =  Fast Twitch (Number of 1 second contractions in 10 seconds)  8  E =  Elevation (Lifting of post vag wall toward pubic bone) Present/Absent   Present  C =  Co-Contraction of transverse abdominus Present/Absent   Present  T = Timing (squeeze and lift with cough) Present/Absent    Present      Pain Level (0-10 scale) post treatment: 0/10    ASSESSMENT/Changes in Function:     []  See Plan of Care  []  See progress note/recertification  [x]  See Discharge Summary         Progress towards goals / Updated goals:  Goals met, see DC summary     PLAN  []  Upgrade activities as tolerated     []  Continue plan of care  []  Update interventions per flow sheet       [x]  Discharge due to:Goals met  []  Other:_      Santino Hooks PT, MSPT  6/14/2018  8:15 AM

## 2018-06-20 NOTE — PROGRESS NOTES
[address] [facility] Ally Pearson  Phone: [phone]  Fax: [phone]    Discharge Summary  2-15    Patient name: Rory Torres  : 1955  Provider#: [id number]  Referral source: Alex Antonio MD      Medical/Treatment Diagnosis: Cystocele, unspecified [N81.10]  Unspecified urinary incontinence [R32]     Prior Hospitalization: see medical history     Comorbidities: See Plan of Care  Prior Level of Function:See Plan of Care  Medications: Verified on Patient Summary List    Start of Care: 3/22/18      Onset Date: 5+ years   Visits from Start of Care: 9     Missed Visits:  0  Reporting Period :  3/22/18 - 18      ASSESSMENT/SUMMARY OF CARE:  Mrs Sims was referred to pelvic PT for evaluation and treatment of stress and urge urinary incontinence. Treatment consisted of manual therapy, therapeutic exercise, therapeutic activity, bladder health education, bowel habit education, biofeedback assisted pelvic floor training, home exercise program development and progression. She made steady gains with PT efforts demonstrating improved strength, endurance, coordination and elimination of urinary incontinence. She is discharged today with excellent results. She is independent with a home exercise program to maintain her results.       PERFECT SCORE CHART   (Vaginal)  P =  Power (Laycock Scale Grade 0-5)    4/5  E =  Endurance (How long can pt hold max contraction)   10  R =  Repetitions (How many times the pt can repeat the holds)   5+  F =  Fast Twitch (Number of 1 second contractions in 10 seconds)  8  E =  Elevation (Lifting of post vag wall toward pubic bone) Present/Absent   Present  C =  Co-Contraction of transverse abdominus Present/Absent   Present  T = Timing (squeeze and lift with cough) Present/Absent    Present                    Short Term Goals: To be accomplished in 6 weeks:  Patient will be independent with a progressive home exercise program   MET   Patient will demonstrate & utilized pelvic floor ms protection techniques   MET  Patient will demonstrate improved PFM strength to 4/5 bilaterally in order to decrease DOT symptoms   MET  Patient will complete a 72 hour bladder diary   MET   Patient will be independent with urge suppression techniques, bladder irritant elmination MET     Long Term Goals: To be accomplished in 12 weeks:  Patient will demonstrate 4+/5 PFM strength bilaterally in order to eliminate DOT symptoms. PROGRESSING  Patient will demonstrate 10 second PFM holds at 4/5 in order to elminate DOT symptoms. MET  Patient will have no loss of urine with cough/sneeze.   MET  Patient will have no loss of urine with urge triggers (key in door, pulling pants down)  MET    RECOMMENDATIONS:  [x]Discontinue therapy: [x]Patient has reached or is progressing toward set goals      []Patient is non-compliant or has abdicated      []Due to lack of appreciable progress towards set goals      []Other    Leo Chu, PT, MSPT      6/20/2018 11:05 AM

## 2018-10-11 ENCOUNTER — OFFICE VISIT (OUTPATIENT)
Dept: INTERNAL MEDICINE CLINIC | Age: 63
End: 2018-10-11

## 2018-10-11 VITALS
DIASTOLIC BLOOD PRESSURE: 67 MMHG | RESPIRATION RATE: 16 BRPM | HEIGHT: 64 IN | TEMPERATURE: 98.9 F | SYSTOLIC BLOOD PRESSURE: 123 MMHG | WEIGHT: 141 LBS | BODY MASS INDEX: 24.07 KG/M2 | OXYGEN SATURATION: 9 % | HEART RATE: 69 BPM

## 2018-10-11 DIAGNOSIS — J30.89 SEASONAL ALLERGIC RHINITIS DUE TO OTHER ALLERGIC TRIGGER: Primary | ICD-10-CM

## 2018-10-11 DIAGNOSIS — Z00.00 ROUTINE GENERAL MEDICAL EXAMINATION AT A HEALTH CARE FACILITY: ICD-10-CM

## 2018-10-11 NOTE — PROGRESS NOTES
HISTORY OF PRESENT ILLNESS Mayda Flores is a 58 y.o. female. HPI Pt will be traveling to [place] next week. Pt will have new granddaughter in November. Denies fever or chills. Review of Systems All other systems reviewed and are negative. Physical Exam  
Constitutional: She is oriented to person, place, and time. She appears well-developed and well-nourished. HENT:  
Head: Normocephalic and atraumatic. Right Ear: External ear normal.  
Left Ear: External ear normal.  
Nose: Nose normal.  
Mouth/Throat: Oropharynx is clear and moist.  
Eyes: Conjunctivae and EOM are normal.  
Neck: Normal range of motion. Neck supple. Carotid bruit is not present. No thyroid mass and no thyromegaly present. Cardiovascular: Normal rate, regular rhythm, S1 normal, S2 normal, normal heart sounds and intact distal pulses. Pulmonary/Chest: Effort normal and breath sounds normal.  
Abdominal: Soft. Normal appearance and bowel sounds are normal. There is no hepatosplenomegaly. There is no tenderness. Musculoskeletal: Normal range of motion. Neurological: She is alert and oriented to person, place, and time. She has normal strength. No cranial nerve deficit or sensory deficit. Coordination normal.  
Skin: Skin is warm, dry and intact. No abrasion and no rash noted. Psychiatric: She has a normal mood and affect. Her behavior is normal. Judgment and thought content normal.  
Nursing note and vitals reviewed. ASSESSMENT and PLAN Diagnoses and all orders for this visit: 1. Routine general medical examination at a health care facility- to do before physical 
-     CBC W/O DIFF 
-     METABOLIC PANEL, COMPREHENSIVE 
-     LIPID PANEL 
-     TSH 3RD GENERATION 
-     URINALYSIS W/ RFLX MICROSCOPIC Additional Comments: Administered flu shot today in office. Pt will f/u for tetanus booster at next CPE in November. She has allergies but takes occasional meds Lab results and schedule of future lab studies reviewed with patient. Reviewed diet, exercise and weight control. Written by Tamara Raza, as dictated by Matt Tariq MD.  
 
Current diagnosis and concerns discussed with pt at length. Understands risks and benefits or current treatment plan and medications and accepts the treatment and medication with any possible risks. Pt asks appropriate questions which were answered. Pt instructed to call with any concerns or problems.

## 2018-10-30 ENCOUNTER — OFFICE VISIT (OUTPATIENT)
Dept: INTERNAL MEDICINE CLINIC | Age: 63
End: 2018-10-30

## 2018-10-30 VITALS
OXYGEN SATURATION: 97 % | RESPIRATION RATE: 14 BRPM | WEIGHT: 139.6 LBS | HEART RATE: 74 BPM | TEMPERATURE: 98.3 F | BODY MASS INDEX: 23.83 KG/M2 | DIASTOLIC BLOOD PRESSURE: 72 MMHG | SYSTOLIC BLOOD PRESSURE: 111 MMHG | HEIGHT: 64 IN

## 2018-10-30 DIAGNOSIS — R79.89 ABNORMAL THYROID BLOOD TEST: ICD-10-CM

## 2018-10-30 DIAGNOSIS — Z00.00 ROUTINE GENERAL MEDICAL EXAMINATION AT A HEALTH CARE FACILITY: Primary | ICD-10-CM

## 2018-10-30 DIAGNOSIS — Z12.31 VISIT FOR SCREENING MAMMOGRAM: ICD-10-CM

## 2018-10-30 LAB
ALBUMIN SERPL-MCNC: 4.5 G/DL (ref 3.6–4.8)
ALBUMIN/GLOB SERPL: 1.7 {RATIO} (ref 1.2–2.2)
ALP SERPL-CCNC: 63 IU/L (ref 39–117)
ALT SERPL-CCNC: 13 IU/L (ref 0–32)
APPEARANCE UR: CLEAR
AST SERPL-CCNC: 19 IU/L (ref 0–40)
BACTERIA #/AREA URNS HPF: NORMAL /[HPF]
BILIRUB SERPL-MCNC: 0.5 MG/DL (ref 0–1.2)
BILIRUB UR QL STRIP: NEGATIVE
BUN SERPL-MCNC: 12 MG/DL (ref 8–27)
BUN/CREAT SERPL: 13 (ref 12–28)
CALCIUM SERPL-MCNC: 9.6 MG/DL (ref 8.7–10.3)
CASTS URNS QL MICRO: NORMAL /LPF
CHLORIDE SERPL-SCNC: 102 MMOL/L (ref 96–106)
CHOLEST SERPL-MCNC: 232 MG/DL (ref 100–199)
CO2 SERPL-SCNC: 26 MMOL/L (ref 20–29)
COLOR UR: YELLOW
CREAT SERPL-MCNC: 0.91 MG/DL (ref 0.57–1)
EPI CELLS #/AREA URNS HPF: NORMAL /HPF
ERYTHROCYTE [DISTWIDTH] IN BLOOD BY AUTOMATED COUNT: 13.7 % (ref 12.3–15.4)
GLOBULIN SER CALC-MCNC: 2.6 G/DL (ref 1.5–4.5)
GLUCOSE SERPL-MCNC: 94 MG/DL (ref 65–99)
GLUCOSE UR QL: NEGATIVE
HCT VFR BLD AUTO: 42.3 % (ref 34–46.6)
HDLC SERPL-MCNC: 79 MG/DL
HGB BLD-MCNC: 14.1 G/DL (ref 11.1–15.9)
HGB UR QL STRIP: NEGATIVE
INTERPRETATION, 910389: NORMAL
KETONES UR QL STRIP: NEGATIVE
LDLC SERPL CALC-MCNC: 137 MG/DL (ref 0–99)
LEUKOCYTE ESTERASE UR QL STRIP: ABNORMAL
MCH RBC QN AUTO: 29.2 PG (ref 26.6–33)
MCHC RBC AUTO-ENTMCNC: 33.3 G/DL (ref 31.5–35.7)
MCV RBC AUTO: 88 FL (ref 79–97)
MICRO URNS: ABNORMAL
MUCOUS THREADS URNS QL MICRO: PRESENT
NITRITE UR QL STRIP: NEGATIVE
PH UR STRIP: 7.5 [PH] (ref 5–7.5)
PLATELET # BLD AUTO: 208 X10E3/UL (ref 150–379)
POTASSIUM SERPL-SCNC: 4.6 MMOL/L (ref 3.5–5.2)
PROT SERPL-MCNC: 7.1 G/DL (ref 6–8.5)
PROT UR QL STRIP: NEGATIVE
RBC # BLD AUTO: 4.83 X10E6/UL (ref 3.77–5.28)
RBC #/AREA URNS HPF: NORMAL /HPF
SODIUM SERPL-SCNC: 140 MMOL/L (ref 134–144)
SP GR UR: 1.01 (ref 1–1.03)
TRIGL SERPL-MCNC: 81 MG/DL (ref 0–149)
TSH SERPL DL<=0.005 MIU/L-ACNC: 5.52 UIU/ML (ref 0.45–4.5)
UROBILINOGEN UR STRIP-MCNC: 0.2 MG/DL (ref 0.2–1)
VLDLC SERPL CALC-MCNC: 16 MG/DL (ref 5–40)
WBC # BLD AUTO: 3.9 X10E3/UL (ref 3.4–10.8)
WBC #/AREA URNS HPF: NORMAL /HPF

## 2018-10-30 NOTE — PROGRESS NOTES
HISTORY OF PRESENT ILLNESS Cathy Oliveira is a 58 y.o. female. HPI She is doing well- lipids are really a little higher but stable and we will watch Thyroid is a little off and we are going to watch She still feels like she has a twinge from her dioverticulitis and is going to make an appt with GI in Jan  
 
Review of Systems Constitutional: Negative for chills, diaphoresis, fever, malaise/fatigue and weight loss. HENT: Negative for congestion, ear pain, hearing loss, sore throat and tinnitus. Eyes: Negative for blurred vision and double vision. Respiratory: Negative for cough, hemoptysis, sputum production, shortness of breath and wheezing. Cardiovascular: Negative for chest pain, palpitations, orthopnea, claudication, leg swelling and PND. Gastrointestinal: Negative for abdominal pain, blood in stool, constipation, diarrhea, heartburn, nausea and vomiting. Genitourinary: Negative for dysuria, flank pain, frequency, hematuria and urgency. Musculoskeletal: Negative for back pain, joint pain, myalgias and neck pain. Skin: Negative. Neurological: Negative for dizziness, tingling, sensory change, speech change, focal weakness, seizures, loss of consciousness, weakness and headaches. Endo/Heme/Allergies: Negative for environmental allergies and polydipsia. Does not bruise/bleed easily. Psychiatric/Behavioral: Negative for depression, memory loss, substance abuse and suicidal ideas. The patient is not nervous/anxious and does not have insomnia. Physical Exam  
Constitutional: She is oriented to person, place, and time. She appears well-developed and well-nourished. HENT:  
Head: Normocephalic and atraumatic. Right Ear: External ear normal.  
Left Ear: External ear normal.  
Nose: Nose normal.  
Mouth/Throat: Oropharynx is clear and moist.  
Eyes: Conjunctivae and EOM are normal. Pupils are equal, round, and reactive to light. Neck: Normal range of motion. Neck supple. Cardiovascular: Normal rate, regular rhythm, normal heart sounds and intact distal pulses. Pulmonary/Chest: Effort normal and breath sounds normal. Right breast exhibits no inverted nipple, no mass, no nipple discharge, no skin change and no tenderness. Left breast exhibits no inverted nipple, no mass, no nipple discharge, no skin change and no tenderness. Breasts are symmetrical.  
Abdominal: Soft. Bowel sounds are normal.  
Genitourinary: Rectum normal. Rectal exam shows anal tone normal and guaiac negative stool. No breast swelling, tenderness, discharge or bleeding. Musculoskeletal: Normal range of motion. Neurological: She is alert and oriented to person, place, and time. Skin: Skin is warm and dry. Psychiatric: She has a normal mood and affect. Her behavior is normal. Judgment and thought content normal.  
Nursing note and vitals reviewed. ASSESSMENT and PLAN Diagnoses and all orders for this visit: 1. Visit for screening mammogram 
-     [facility] 3D SIMEON W MAMMO BI SCREENING INCL CAD; Future 2. Abnormal thyroid blood test 
-     TSH 3RD GENERATION 
-     T4, FREE 3. Routine general medical examination at a health care facility 
 
 
lab results and schedule of future lab studies reviewed with patient 
reviewed diet, exercise and weight control 
cardiovascular risk and specific lipid/LDL goals reviewed 
reviewed medications and side effects in detail

## 2018-11-27 ENCOUNTER — HOSPITAL ENCOUNTER (OUTPATIENT)
Dept: MAMMOGRAPHY | Age: 63
Discharge: HOME | End: 2018-11-27
Attending: INTERNAL MEDICINE
Payer: COMMERCIAL

## 2018-11-27 DIAGNOSIS — Z12.31 VISIT FOR SCREENING MAMMOGRAM: ICD-10-CM

## 2018-11-27 PROCEDURE — 77063 BREAST TOMOSYNTHESIS BI: CPT

## 2019-01-22 ENCOUNTER — OFFICE VISIT (OUTPATIENT)
Dept: OBGYN CLINIC | Age: 64
End: 2019-01-22

## 2019-01-22 VITALS
DIASTOLIC BLOOD PRESSURE: 64 MMHG | SYSTOLIC BLOOD PRESSURE: 118 MMHG | WEIGHT: 142.4 LBS | BODY MASS INDEX: 24.31 KG/M2 | HEIGHT: 64 IN

## 2019-01-22 DIAGNOSIS — R32 URINARY INCONTINENCE, UNSPECIFIED TYPE: ICD-10-CM

## 2019-01-22 DIAGNOSIS — N81.10 PROLAPSE OF ANTERIOR VAGINAL WALL: ICD-10-CM

## 2019-01-22 DIAGNOSIS — Z01.419 ENCOUNTER FOR GYNECOLOGICAL EXAMINATION (GENERAL) (ROUTINE) WITHOUT ABNORMAL FINDINGS: Primary | ICD-10-CM

## 2019-01-22 LAB
T4 FREE SERPL-MCNC: 1.08 NG/DL (ref 0.82–1.77)
TSH SERPL DL<=0.005 MIU/L-ACNC: 4.68 UIU/ML (ref 0.45–4.5)

## 2019-01-22 NOTE — PROGRESS NOTES
[address] [facility]  [email or web address]  [phone]    Geraldine Mtz MD, [address] [facility]       Annual Gynecologic Exam:   WWE 60+  Chief Complaint   Patient presents with    Well Woman    Urinary Incontinence         Yuridia Sims is a 58 y.o. No obstetric history on file. WHITE OR   female who presents for an annual exam.    She does not report additional concerns today. Sexual history and Contraception:  Social History     Substance and Sexual Activity   Sexual Activity Yes    Partners: Male    Birth control/protection: None     She does not reports new sexual partner(s) in the last year. Preventive Medicine History:  Her most recent Pap smear result: normal was obtained in August 2016  Her most recent HR HPV screen was Negative obtained in 2016    She does not have a history of TANNA 2, 3 or cervical cancer. Breast health:  Last mammogram: approximate date 11/27/18 and was normal.   A mammogram was not scheduled for today. Breast cancer family updated: see . Bone health: [place] She has not had a bone density scan in the past.   Last bone density test results: n/a. She does not have a history of osteopenia/osteoporosis. Osteoporosis family history updated: see .      Past Medical History:   Diagnosis Date    H/O mammogram 11/15/2017    Negative    Hx of mammogram 8/20/2014;08/05/2016    normal; negative    Pap smear for cervical cancer screening 07/28/2010    negative, HPV negative    Pap smear for cervical cancer screening 08/04/2016    negative, HPV negative     Past Surgical History:   Procedure Laterality Date    ENDOSCOPY, COLON, DIAGNOSTIC      2011 normal    HX ORTHOPAEDIC      foot surgeries     Family History   Problem Relation Age of Onset    Heart Disease Mother     Cancer Mother         endometrial    Hypertension Mother     Hypertension Father     Cancer Sister         rectal    Breast Cancer Other 61        paternal cousin , triple neg Social History     Socioeconomic History    Marital status:      Spouse name: Not on file    Number of children: Not on file    Years of education: Not on file    Highest education level: Not on file   Social Needs    Financial resource strain: Not on file    Food insecurity - worry: Not on file    Food insecurity - inability: Not on file    Transportation needs - medical: Not on file   [employer] needs - non-medical: Not on file   Occupational History    Not on file   Tobacco Use    Smoking status: Never Smoker    Smokeless tobacco: Never Used   Substance and Sexual Activity    Alcohol use: Yes    Drug use: No    Sexual activity: Yes     Partners: Male     Birth control/protection: None   Other Topics Concern    Not on file   Social History Narrative    Not on file       No Known Allergies    Current Outpatient Medications   Medication Sig    Omega-3 Fatty Acids (FISH OIL) 300 mg cap Take  by mouth.  PSYLLIUM HUSK/CALCIUM CARB (PSYLLIUM HUSK-CALCIUM PO) Take  by mouth.  acyclovir (ZOVIRAX) 5 % ointment Apply  to affected area every three (3) hours.  OM3-DHA/EPA/D3/LUTEIN/ZEAZANTH (EYE OMEGA ADVANTAGE PO) Take 2,000 mg by mouth two (2) times a day. No current facility-administered medications for this visit.         Patient Active Problem List   Diagnosis Code    Urinary incontinence R32    Prolapse of anterior vaginal wall N81.10       Review of Systems - History obtained from the patient  Constitutional: negative for weight loss, fever, night sweats  HEENT: negative for hearing loss, earache, congestion, snoring, sorethroat  CV: negative for chest pain, palpitations, edema  Resp: negative for cough, shortness of breath, wheezing  GI: negative for change in bowel habits, abdominal pain, black or bloody stools  : +UI    MSK: negative for back pain, joint pain, muscle pain  Breast: negative for breast lumps, nipple discharge, galactorrhea  Skin :negative for itching, rash, hives  Neuro: negative for dizziness, headache, confusion, weakness  Psych: negative for anxiety, depression, change in mood  Heme/lymph: negative for bleeding, bruising, pallor    Physical Exam  Visit Vitals  /64   Ht 5' 4\" (1.626 m)   Wt 142 lb 6.4 oz (64.6 kg)   BMI 24.44 kg/m²       Constitutional  · Appearance: well-nourished, well developed, alert, in no acute distress    HENT  · Head and Face: appears normal    Neck  · Inspection/Palpation: normal appearance, no masses or tenderness  · Lymph Nodes: no lymphadenopathy present  · Thyroid: gland size normal, nontender, no nodules or masses present on palpation    Chest  · Respiratory Effort: breathing unlabored  · Auscultation: normal breath sounds    Cardiovascular  · Heart:  · Auscultation: regular rate and rhythm without murmur    Breasts  · Inspection of Breasts: breasts symmetrical, no skin changes, no discharge present, nipple appearance normal, no skin retraction present  · Palpation of Breasts and Axillae: no masses present on palpation, no breast tenderness  · Axillary Lymph Nodes: no lymphadenopathy present    Gastrointestinal  · Abdominal Examination: abdomen non-tender to palpation, normal bowel sounds, no masses present  · Liver and spleen: no hepatomegaly present, spleen not palpable  · Hernias: no hernias identified    Genitourinary  · External Genitalia: normal appearance for age, no discharge present, no tenderness present, no inflammatory lesions present, no masses present, with atrophy present  · Vagina: normal vaginal vault with anterior central or paravaginal defects, no discharge present, no inflammatory lesions present, no masses present  · Bladder: non-tender to palpation  · Urethra: appears normal  · Cervix: normal   · Uterus: normal size, shape and consistency  · Adnexa: no adnexal tenderness present, no adnexal masses present  · Perineum: perineum within normal limits, no evidence of trauma, no rashes or skin lesions present  · Anus: anus within normal limits, no hemorrhoids present  · Inguinal Lymph Nodes: no lymphadenopathy present    Skin  · General Inspection: no rash, no lesions identified    Neurologic/Psychiatric  · Mental Status:  · Orientation: grossly oriented to person, place and time  · Mood and Affect: mood normal, affect appropriate    Assessment:  58 y.o. No obstetric history on file. for well woman exam  Encounter Diagnoses   Name Primary?  Encounter for gynecological examination (general) (routine) without abnormal findings Yes    Urinary incontinence, unspecified type     Prolapse of anterior vaginal wall        Plan:  The patient was counseled about diet, exercise, healthy lifestyle  We discussed current self breast exam and mammogram recommendations  We discussed current pap smear and HR HPV testing guidelines  We recommend follow up in one year for routine annual gynecologic exam or sooner if needed  We recommend follow up with a primary care physician for any chronic medical problems or non-gynecologic concerns    We discussed calcium/vitamin D/weight bearing exercise and osteoporosis prevention and bone density screening recommendations. Handouts were given to the patient  Pessary h/o  Bladder exercise h/o  Consider PT fu  Disc options for UI  Pt will consider options and notify MD Hogan up:  [x] return for annual well woman exam in one year or sooner if she is having problems  [] follow up and ultrasound  [] mammogram  [] 6 months  [] 6 weeks   []     No orders of the defined types were placed in this encounter. No results found for any visits on 01/22/19.

## 2019-01-22 NOTE — PATIENT INSTRUCTIONS
Well Visit, Women 48 to 72: Care Instructions  Your Care Instructions    Physical exams can help you stay healthy. Your doctor has checked your overall health and may have suggested ways to take good care of yourself. He or she also may have recommended tests. At home, you can help prevent illness with healthy eating, regular exercise, and other steps. Follow-up care is a key part of your treatment and safety. Be sure to make and go to all appointments, and call your doctor if you are having problems. It's also a good idea to know your test results and keep a list of the medicines you take. How can you care for yourself at home? · Reach and stay at a healthy weight. This will lower your risk for many problems, such as obesity, diabetes, heart disease, and high blood pressure. · Get at least 30 minutes of exercise on most days of the week. Walking is a good choice. You also may want to do other activities, such as running, swimming, cycling, or playing tennis or team sports. · Do not smoke. Smoking can make health problems worse. If you need help quitting, talk to your doctor about stop-smoking programs and medicines. These can increase your chances of quitting for good. · Protect your skin from too much sun. When you're outdoors from 10 a.m. to 4 p.m., stay in the shade or cover up with clothing and a hat with a wide brim. Wear sunglasses that block UV rays. Even when it's cloudy, put broad-spectrum sunscreen (SPF 30 or higher) on any exposed skin. · See a dentist one or two times a year for checkups and to have your teeth cleaned. · Wear a seat belt in the car. · Limit alcohol to 1 drink a day. Too much alcohol can cause health problems. Follow your doctor's advice about when to have certain tests. These tests can spot problems early. · Cholesterol.  Your doctor will tell you how often to have this done based on your age, family history, or other things that can increase your risk for heart attack and stroke. · Blood pressure. Have your blood pressure checked during a routine doctor visit. Your doctor will tell you how often to check your blood pressure based on your age, your blood pressure results, and other factors. · Mammogram. Ask your doctor how often you should have a mammogram, which is an X-ray of your breasts. A mammogram can spot breast cancer before it can be felt and when it is easiest to treat. · Pap test and pelvic exam. Ask your doctor how often you should have a Pap test. You may not need to have a Pap test as often as you used to. · Vision. Have your eyes checked every year or two or as often as your doctor suggests. Some experts recommend that you have yearly exams for glaucoma and other age-related eye problems starting at age 48. · Hearing. Tell your doctor if you notice any change in your hearing. You can have tests to find out how well you hear. · Diabetes. Ask your doctor whether you should have tests for diabetes. · Colon cancer. You should begin tests for colon cancer at age 48. You may have one of several tests. Your doctor will tell you how often to have tests based on your age and risk. Risks include whether you already had a precancerous polyp removed from your colon or whether your parents, sisters and brothers, or children have had colon cancer. · Thyroid disease. Talk to your doctor about whether to have your thyroid checked as part of a regular physical exam. Women have an increased chance of a thyroid problem. · Osteoporosis. You should begin tests for bone density at age 72. If you are younger than 72, ask your doctor whether you have factors that may increase your risk for this disease. You may want to have this test before age 72. · Heart attack and stroke risk. At least every 4 to 6 years, you should have your risk for heart attack and stroke assessed.  Your doctor uses factors such as your age, blood pressure, cholesterol, and whether you smoke or have diabetes to show what your risk for a heart attack or stroke is over the next 10 years. When should you call for help? Watch closely for changes in your health, and be sure to contact your doctor if you have any problems or symptoms that concern you. Where can you learn more? Go to http://brittany-jackie.info/. Enter V261 in the search box to learn more about \"Well Visit, Women 50 to 72: Care Instructions. \"  Current as of: March 28, 2018  Content Version: 11.9  © 5376-9245 [employer]. Care instructions adapted under license by [employer] (which disclaims liability or warranty for this information). If you have questions about a medical condition or this instruction, always ask your healthcare professional. [address] any warranty or liability for your use of this information.

## 2019-05-14 ENCOUNTER — OFFICE VISIT (OUTPATIENT)
Dept: INTERNAL MEDICINE CLINIC | Age: 64
End: 2019-05-14

## 2019-05-14 VITALS
SYSTOLIC BLOOD PRESSURE: 125 MMHG | BODY MASS INDEX: 23.7 KG/M2 | WEIGHT: 138.8 LBS | HEART RATE: 63 BPM | DIASTOLIC BLOOD PRESSURE: 79 MMHG | TEMPERATURE: 98.1 F | HEIGHT: 64 IN | OXYGEN SATURATION: 98 % | RESPIRATION RATE: 16 BRPM

## 2019-05-14 DIAGNOSIS — Z00.00 WELCOME TO MEDICARE PREVENTIVE VISIT: Primary | ICD-10-CM

## 2019-05-14 DIAGNOSIS — H00.012 HORDEOLUM EXTERNUM OF RIGHT LOWER EYELID: ICD-10-CM

## 2019-05-14 DIAGNOSIS — R13.19 OTHER DYSPHAGIA: ICD-10-CM

## 2019-05-14 DIAGNOSIS — Z78.0 POST-MENOPAUSAL: ICD-10-CM

## 2019-05-14 DIAGNOSIS — B00.1 COLD SORE: ICD-10-CM

## 2019-05-14 DIAGNOSIS — M54.2 NECK PAIN: ICD-10-CM

## 2019-05-14 RX ORDER — ACYCLOVIR 50 MG/G
OINTMENT TOPICAL
Qty: 2 G | Refills: 5 | Status: SHIPPED | OUTPATIENT
Start: 2019-05-14

## 2019-05-14 RX ORDER — ERYTHROMYCIN 5 MG/G
OINTMENT OPHTHALMIC
Qty: 3.5 G | Refills: 0 | Status: SHIPPED | OUTPATIENT
Start: 2019-05-14 | End: 2020-11-18

## 2019-05-14 RX ORDER — CEPHALEXIN 500 MG/1
500 CAPSULE ORAL 2 TIMES DAILY
Qty: 14 CAP | Refills: 0 | Status: SHIPPED | OUTPATIENT
Start: 2019-05-14 | End: 2020-11-18

## 2019-05-14 NOTE — PATIENT INSTRUCTIONS
Medicare Wellness Visit, Female The best way to live healthy is to have a lifestyle where you eat a well-balanced diet, exercise regularly, limit alcohol use, and quit all forms of tobacco/nicotine, if applicable. Regular preventive services are another way to keep healthy. Preventive services (vaccines, screening tests, monitoring & exams) can help personalize your care plan, which helps you manage your own care. Screening tests can find health problems at the earliest stages, when they are easiest to treat. [facility] follows the current, evidence-based guidelines published by the [facility] ([facility]) when recommending preventive services for our patients. Because we follow these guidelines, sometimes recommendations change over time as research supports it. (For example, mammograms used to be recommended annually. Even though Medicare will still pay for an annual mammogram, the newer guidelines recommend a mammogram every two years for women of average risk.) Of course, you and your doctor may decide to screen more often for some diseases, based on your risk and your health status. Preventive services for you include: - Medicare offers their members a free annual wellness visit, which is time for you and your primary care provider to discuss and plan for your preventive service needs. Take advantage of this benefit every year! 
-All adults over the age of 72 should receive the recommended pneumonia vaccines. Current USPSTF guidelines recommend a series of two vaccines for the best pneumonia protection.  
-All adults should have a flu vaccine yearly and a tetanus vaccine every 10 years. All adults age 61 and older should receive a shingles vaccine once in their lifetime.   
-A bone mass density test is recommended when a woman turns 65 to screen for osteoporosis. This test is only recommended one time, as a screening. Some providers will use this same test as a disease monitoring tool if you already have osteoporosis. -All adults age 38-68 who are overweight should have a diabetes screening test once every three years.  
-Other screening tests and preventive services for persons with diabetes include: an eye exam to screen for diabetic retinopathy, a kidney function test, a foot exam, and stricter control over your cholesterol.  
-Cardiovascular screening for adults with routine risk involves an electrocardiogram (ECG) at intervals determined by your doctor.  
-Colorectal cancer screenings should be done for adults age 54-65 with no increased risk factors for colorectal cancer. There are a number of acceptable methods of screening for this type of cancer. Each test has its own benefits and drawbacks. Discuss with your doctor what is most appropriate for you during your annual wellness visit. The different tests include: colonoscopy (considered the best screening method), a fecal occult blood test, a fecal DNA test, and sigmoidoscopy. -Breast cancer screenings are recommended every other year for women of normal risk, age 54-69. 
-Cervical cancer screenings for women over age 72 are only recommended with certain risk factors.  
-All adults born between [facility] should be screened once for Hepatitis C. Here is a list of your current Health Maintenance items (your personalized list of preventive services) with a due date: 
Health Maintenance Due Topic Date Due  Bone Mineral Density   04/21/2019  Pneumococcal Vaccine (1 of 2 - PCV13) 04/21/2019

## 2019-05-14 NOTE — PROGRESS NOTES
This is a \"Welcome to [employer]"  Initial Preventive Physical Examination (IPPE) providing Personalized Prevention Plan Services (Performed in the first 12 months of enrollment) I have reviewed the patient's medical history in detail and updated the computerized patient record. She occasionally has cold sore and needs refill on it She has a stye on her right eye and seems to be in bottom of eye- and hurting and swelling a little She does have a little diff swallowing with solid food and PB did not go down She has neck pain down the pain down the left arm - her son gave her exercises and now the skin History Past Medical History:  
Diagnosis Date  H/O mammogram 11/15/2017 Negative  Hx of mammogram 8/20/2014;08/05/2016  
 normal; negative  Pap smear for cervical cancer screening 07/28/2010  
 negative, HPV negative  Pap smear for cervical cancer screening 08/04/2016  
 negative, HPV negative Past Surgical History:  
Procedure Laterality Date  ENDOSCOPY, COLON, DIAGNOSTIC    
 2011 normal  
 HX ORTHOPAEDIC    
 foot surgeries Current Outpatient Medications Medication Sig Dispense Refill  Omega-3 Fatty Acids (FISH OIL) 300 mg cap Take  by mouth.  OM3-DHA/EPA/D3/LUTEIN/ZEAZANTH (EYE OMEGA ADVANTAGE PO) Take 2,000 mg by mouth two (2) times a day.  PSYLLIUM HUSK/CALCIUM CARB (PSYLLIUM HUSK-CALCIUM PO) Take  by mouth.  acyclovir (ZOVIRAX) 5 % ointment Apply  to affected area every three (3) hours. 2 g 3 No Known Allergies Family History Problem Relation Age of Onset  Heart Disease Mother  Cancer Mother   
     endometrial  
 Hypertension Mother  Hypertension Father  Cancer Sister   
     rectal  
 Breast Cancer Other 61  
     paternal cousin , triple neg Social History Tobacco Use  Smoking status: Never Smoker  Smokeless tobacco: Never Used Substance Use Topics  Alcohol use:  Yes  
 
 Diet, Lifestyle: No special diet Exercise level: moderately active Depression Risk Screen 3 most recent PHQ Screens 5/14/2019 Little interest or pleasure in doing things Not at all Feeling down, depressed, irritable, or hopeless Not at all Total Score PHQ 2 0 Alcohol Risk Screen You do not drink alcohol or very rarely. Functional Ability and Level of Safety Hearing Loss Hearing is good. Vision Screening Vision is good. No exam data present Activities of Daily Living The home contains: no safety equipment. Patient does total self care Fall Risk Screen Fall Risk Assessment, last 12 mths 5/14/2019 Able to walk? Yes Fall in past 12 months? No  
 
 
Abuse Screen Patient is not abused Screening EKG EKG order placed: Yes Patient Care Team  
Patient Care Team: 
Corinne Olivas MD as PCP - General (Internal Medicine) Low Webster MD (Obstetrics & Gynecology) End of Life Planning Advanced care planning directives were discussed with the patient and /or family/caregiver. Assessment/Plan Education and counseling provided: 
Are appropriate based on today's review and evaluation 1. Post-menopausal 
 
- DEXA BONE DENSITY STUDY AXIAL; Future 2. Cold sore 
 
- acyclovir (ZOVIRAX) 5 % ointment; Apply  to affected area every three (3) hours. Dispense: 2 g; Refill: 5 
 
3. Welcome to Medicare preventive visit 
 
- CBC W/O DIFF 
- METABOLIC PANEL, COMPREHENSIVE 
- LIPID PANEL 
- TSH 3RD GENERATION 
- AMB POC EKG ROUTINE W/ 12 LEADS, INTER & REP 4. Hordeolum externum of right lower eyelid - cephALEXin (KEFLEX) 500 mg capsule; Take 1 Cap by mouth two (2) times a day. Dispense: 14 Cap; Refill: 0 
- erythromycin (ILOTYCIN) ophthalmic ointment; Thin layer on lower lid  Dispense: 3.5 g; Refill: 0 
 
5. Other dysphagia She is going to see GI and discuss this further as may need EGD 6. Neck pain I did give her an order for PT 
 - XR SPINE CERV 4 OR 5 V; Future Health Maintenance Due Topic Date Due  Bone Densitometry (Dexa) Screening  04/21/2019  Pneumococcal 65+ years (1 of 2 - PCV13) 04/21/2019 This note will not be viewable in [address]

## 2019-05-15 LAB
ALBUMIN SERPL-MCNC: 4.5 G/DL (ref 3.6–4.8)
ALBUMIN/GLOB SERPL: 1.7 {RATIO} (ref 1.2–2.2)
ALP SERPL-CCNC: 63 IU/L (ref 39–117)
ALT SERPL-CCNC: 9 IU/L (ref 0–32)
AST SERPL-CCNC: 19 IU/L (ref 0–40)
BILIRUB SERPL-MCNC: 0.5 MG/DL (ref 0–1.2)
BUN SERPL-MCNC: 21 MG/DL (ref 8–27)
BUN/CREAT SERPL: 21 (ref 12–28)
CALCIUM SERPL-MCNC: 10 MG/DL (ref 8.7–10.3)
CHLORIDE SERPL-SCNC: 100 MMOL/L (ref 96–106)
CHOLEST SERPL-MCNC: 232 MG/DL (ref 100–199)
CO2 SERPL-SCNC: 23 MMOL/L (ref 20–29)
CREAT SERPL-MCNC: 0.99 MG/DL (ref 0.57–1)
ERYTHROCYTE [DISTWIDTH] IN BLOOD BY AUTOMATED COUNT: 13.6 % (ref 12.3–15.4)
GLOBULIN SER CALC-MCNC: 2.6 G/DL (ref 1.5–4.5)
GLUCOSE SERPL-MCNC: 88 MG/DL (ref 65–99)
HCT VFR BLD AUTO: 40.9 % (ref 34–46.6)
HDLC SERPL-MCNC: 86 MG/DL
HGB BLD-MCNC: 14.1 G/DL (ref 11.1–15.9)
INTERPRETATION, 910389: NORMAL
LDLC SERPL CALC-MCNC: 126 MG/DL (ref 0–99)
MCH RBC QN AUTO: 30.2 PG (ref 26.6–33)
MCHC RBC AUTO-ENTMCNC: 34.5 G/DL (ref 31.5–35.7)
MCV RBC AUTO: 88 FL (ref 79–97)
PLATELET # BLD AUTO: 182 X10E3/UL (ref 150–379)
POTASSIUM SERPL-SCNC: 4.7 MMOL/L (ref 3.5–5.2)
PROT SERPL-MCNC: 7.1 G/DL (ref 6–8.5)
RBC # BLD AUTO: 4.67 X10E6/UL (ref 3.77–5.28)
SODIUM SERPL-SCNC: 140 MMOL/L (ref 134–144)
TRIGL SERPL-MCNC: 101 MG/DL (ref 0–149)
TSH SERPL DL<=0.005 MIU/L-ACNC: 3.68 UIU/ML (ref 0.45–4.5)
VLDLC SERPL CALC-MCNC: 20 MG/DL (ref 5–40)
WBC # BLD AUTO: 4.6 X10E3/UL (ref 3.4–10.8)

## 2019-05-16 ENCOUNTER — HOSPITAL ENCOUNTER (OUTPATIENT)
Dept: MAMMOGRAPHY | Age: 64
Discharge: HOME | End: 2019-05-16
Attending: INTERNAL MEDICINE
Payer: MEDICARE

## 2019-05-16 ENCOUNTER — HOSPITAL ENCOUNTER (OUTPATIENT)
Dept: GENERAL RADIOLOGY | Age: 64
Discharge: HOME | End: 2019-05-16
Attending: INTERNAL MEDICINE
Payer: MEDICARE

## 2019-05-16 DIAGNOSIS — Z78.0 POST-MENOPAUSAL: ICD-10-CM

## 2019-05-16 DIAGNOSIS — M54.2 NECK PAIN: ICD-10-CM

## 2019-05-16 PROCEDURE — 77080 DXA BONE DENSITY AXIAL: CPT

## 2019-05-16 PROCEDURE — 72050 X-RAY EXAM NECK SPINE 4/5VWS: CPT

## 2019-05-21 ENCOUNTER — TELEPHONE (OUTPATIENT)
Dept: INTERNAL MEDICINE CLINIC | Age: 64
End: 2019-05-21

## 2019-05-21 DIAGNOSIS — M50.30 DEGENERATIVE CERVICAL DISC: Primary | ICD-10-CM

## 2019-05-21 NOTE — TELEPHONE ENCOUNTER
Regarding: RE:xray  Contact: [phone]  ----- Message from [address] [place] [address] 951, [facility] sent at 5/20/2019  4:24 PM EDT -----    That would be great. I have an appointment with PT  June 4th at 3:00. Thanks    Jeannette Chu  ----- Message -----  From: Deirdre Cleaning LPN  Sent: 4/32/3147  2:38 PM EDT  To: Patria Sims  Subject: Jacob Jaramillo  I am happy to set you up for PT. Would you like me to fax the order upstairs (in our building) to [place] [address]?      ----- Message -----     From: Jacki Espinoza     Sent: 5/20/2019 11:29 AM EDT       To: Jeromy Bob MD  Subject: RE:xray    Thanks for sharing the results. I think I would like to start PT as soon as possible as we have a trip planned to [facility] June 17 and I want to start working on this issue so it won't bother me to much then. Do I need an order from you for PT and how can I share results of the X-ray with them?    ----- Message -----  From: Jeromy Bob MD  Sent: 5/18/2019  2:07 PM EDT  To: Patria Sims  Subject: xray  This shows some arthritis but no narrowing and nothing to significant- the arthritis is more near the lower cervical spine but definitely this can be made better with therapy! I do not see any red flags!

## 2019-06-04 ENCOUNTER — HOSPITAL ENCOUNTER (OUTPATIENT)
Dept: PHYSICAL THERAPY | Age: 64
Discharge: HOME | End: 2019-06-04
Payer: MEDICARE

## 2019-06-04 DIAGNOSIS — M50.30 DEGENERATIVE CERVICAL DISC: ICD-10-CM

## 2019-06-04 PROCEDURE — 97140 MANUAL THERAPY 1/> REGIONS: CPT | Performed by: PHYSICAL THERAPIST

## 2019-06-04 PROCEDURE — 97014 ELECTRIC STIMULATION THERAPY: CPT | Performed by: PHYSICAL THERAPIST

## 2019-06-04 PROCEDURE — 97162 PT EVAL MOD COMPLEX 30 MIN: CPT | Performed by: PHYSICAL THERAPIST

## 2019-06-04 PROCEDURE — 97110 THERAPEUTIC EXERCISES: CPT | Performed by: PHYSICAL THERAPIST

## 2019-06-04 NOTE — PROGRESS NOTES
[address] [place] [address]  Phone: [phone]  Fax: [phone]    Plan of Care/Statement of Necessity for Physical Therapy Services  2-15    Patient name: Bill Horvath  : 1955  Provider#: [id number]  Referral source: Suzi Ann MD      Medical/Treatment Diagnosis: Degenerative cervical disc [M50.30]     Prior Hospitalization: see medical history     Comorbidities: none  Prior Level of Function: performing water aerobics, weight training, walking frequently with no reports of pain or limitation  Medications: Verified on Patient Summary List  Start of Care: 2019   Onset Date: 2019   The Plan of Care and following information is based on the information from the initial evaluation. Assessment/ key information: Pt complains of left sided neck pain that refers down into the upper scapular region. Today pt presents with neck pain at end range of motion, tenderness to palpation of L upper trapezius, and cervical hypomobility. These impairments are limiting the patients ability to turn her head, and continue exercising without pain. Pt would benefit from skilled therapy to address these functional limitations.      Evaluation Complexity History LOW Complexity : Zero comorbidities / personal factors that will impact the outcome / POC; Examination LOW Complexity : 1-2 Standardized tests and measures addressing body structure, function, activity limitation and / or participation in recreation  ;Presentation LOW Complexity : Stable, uncomplicated  ;Clinical Decision Making LOW Complexity : FOTO score of   Overall Complexity Rating: LOW     Problem List: pain affecting function, decrease ROM, decrease ADL/ functional abilitiies, decrease activity tolerance and decrease flexibility/ joint mobility   Treatment Plan may include any combination of the following: Therapeutic exercise, Neuromuscular re-education, Physical agent/modality, Manual therapy, Patient education and Self Care training  Patient / Family readiness to learn indicated by: asking questions, trying to perform skills and interest  Persons(s) to be included in education: patient (P)  Barriers to Learning/Limitations: None  Patient Goal (s): I want to alleviate my neck pain  Patient Self Reported Health Status: good  Rehabilitation Potential: excellent    Short Term Goals: To be accomplished in 8 treatments:  Pt will be able to turn her head to the L WNL with 0/10 pain to improve ability to drive a car  Pt will be report being able to sit for 30 minutes with 0/10 pain in neck   Pt will be able to look down WNL with 0/10 pain in neck to improve ability to look down and tie shoe  Long Term Goals: To be accomplished in 16 treatments:  Pt will report being able to to complete a weight training session with 0/10 pain in neck  Pt will report being able to sit for 1 hour with 0/10 pain in neck   Pt will report being able walk 1 mile with 0/10 pain in neck  Frequency / Duration: Patient to be seen 2 times per week for 8 weeks. Patient/ Caregiver education and instruction: self care, activity modification and exercises    [x]  Plan of care has been reviewed with PTA        Certification Period: 6/4/2019 - 9/4/2019  [employer]ella [employer] 6/4/2019     ________________________________________________________________________    I certify that the above Therapy Services are being furnished while the patient is under my care. I agree with the treatment plan and certify that this therapy is necessary.     [de-identified] Signature:____________________  Date:____________Time: _________

## 2019-06-04 NOTE — PROGRESS NOTES
PT INITIAL EVALUATION NOTE 2-15    Patient Name: Johan Cameron  Date:2019  : 1955  [x]  Patient  Verified  Payor: BLUE CROSS MEDICARE / [place] / Product Type: Managed Care Medicare /    In time:3:10  Out time:4:00  Total Treatment Time (min): 60  Visit #: 1     Treatment Area: Pain in left shoulder [M25.512]    SUBJECTIVE  Pain Level (0-10 scale): 7  Any medication changes, allergies to medications, adverse drug reactions, diagnosis change, or new procedure performed?: [] No    [x] Yes (see summary sheet for update)  Subjective:     Pt complains of insidious neck pain that comes down into the L shoulder. PLOF: gardening, yard work and taking care of children with no pain and limitation  Mechanism of Injury: insidious  Previous Treatment/Compliance: Pt has not received treatment or physical therapy for the current neck pain. H  PMHx/Surgical Hx: none  Work Hx: retired nurse  Living Situation:   Pt Goals: alleviate pain  Barriers: none  Motivation: motivated  Substance use: none   FABQ Score: n/a  Cognition: A & O x 4        OBJECTIVE/EXAMINATION  Posture: Other Observations:      Palpation: Increase tightness of L upper trapezius, pain relief with pressure   Joint Mobility: Hypomobile P/A of lower cervical spine, reproduction of symptoms with L unilateral P/A of C3-4        Cervical AROM (degrees):         R  L    Flexion    WNL        Extension           Side Bending           Rotation                 UPPER QUARTER   MUSCLE STRENGTH  All cervical myotomes >4/5           Special Tests:    Cervical Compression: negative   Cervical Distraction: negative   Spurlings: negative   Deep Cervical Flexor Endurance:      Modality rationale: decrease edema, decrease inflammation, decrease pain and increase tissue extensibility to improve the patients ability to turn her head without pain   Min Type Additional Details   10 [x] Estim: []Att   []Unatt        []TENS instruct []IFC  []Premod   []NMES                     []Other:  []w/US   []w/ice   [x]w/heat  Position: seated  Location: L upper trapezius     []  Traction: [] Cervical       []Lumbar                       [] Prone          []Supine                       []Intermittent   []Continuous Lbs:  [] before manual  [] after manual  []w/heat    []  Ultrasound: []Continuous   [] Pulsed at:                            []1MHz   []3MHz Location:  W/cm2:    []  Paraffin         Location:  []w/heat    []  Ice     []  Heat  []  Ice massage Position:  Location:    []  Laser  []  Other: Position:  Location:    []  Vasopneumatic Device Pressure:       [] lo [] med [] hi   Temperature:    [x] Skin assessment post-treatment:  [x]intact []redness- no adverse reaction    []redness - adverse reaction:     15 min Therapeutic Exercise:  [x] See flow sheet :   Rationale: increase ROM and increase strength to improve the patients ability to turn her head without pain    10 min Manual Therapy:  STM to L upper trapezius, bilateral cervical paraspinals.  Grade I-II P/A joint mobilizations to C2-T1, Grade I-II unilateral mobilizations to C3-5   Rationale: decrease pain, increase ROM and increase tissue extensibility  to improve the patients ability to turn her head without pain            With   [] TE   [] TA   [] neuro   [] other: Patient Education: [x] Review HEP    [] Progressed/Changed HEP based on:   [] positioning   [] body mechanics   [] transfers   [] heat/ice application    [] other:        Other Objective/Functional Measures:    Pain Level (0-10 scale) post treatment: 4      ASSESSMENT:      [x]  See Plan of Julio César Briseno 60 6/4/2019

## 2019-06-06 ENCOUNTER — HOSPITAL ENCOUNTER (OUTPATIENT)
Dept: PHYSICAL THERAPY | Age: 64
Discharge: HOME | End: 2019-06-06
Payer: MEDICARE

## 2019-06-06 PROCEDURE — 97110 THERAPEUTIC EXERCISES: CPT

## 2019-06-06 PROCEDURE — 97140 MANUAL THERAPY 1/> REGIONS: CPT

## 2019-06-06 PROCEDURE — 97014 ELECTRIC STIMULATION THERAPY: CPT

## 2019-06-06 NOTE — PROGRESS NOTES
PT DAILY TREATMENT NOTE - [facility] 2-15    Patient Name: Gloria Presume  Date:2019  : 1955  [x]  Patient  Verified  Payor: BLUE CROSS MEDICARE / [place] / Product Type: Managed Care Medicare /    In time:2:35p  Out time:3:45p  Total Treatment Time (min): 70  Total Timed Codes (min): 55  1:1 Treatment Time ( [address] only): 40   Visit #:  2    Treatment Area: Pain in left shoulder [M25.512]    SUBJECTIVE  Pain Level (0-10 scale): 5  Any medication changes, allergies to medications, adverse drug reactions, diagnosis change, or new procedure performed?: [x] No    [] Yes (see summary sheet for update)  Subjective functional status/changes:   [] No changes reported  Patient reports compliance with HEP.  Patient states she feels a little better, but continues to have pain along her shoulder blade and neck.         OBJECTIVE    Modality rationale: decrease pain and increase tissue extensibility to improve the patients ability to lift, carry, reach, turn to look over shoulder and complete ADL's   Min Type Additional Details      15 [x] Estim: []Att   [x]Unatt    []TENS instruct                  [x]IFC  []Premod   []NMES                     []Other:  []w/US   []w/ice   [x]w/heat  Position:supine  Location:L shoulder       []  Traction: [] Cervical       []Lumbar                       [] Prone          []Supine                       []Intermittent   []Continuous Lbs:  [] before manual  [] after manual  []w/heat    []  Ultrasound: []Continuous   [] Pulsed                       at: []1MHz   []3MHz Location:  W/cm2:    [] Paraffin         Location:   []w/heat    []  Ice     []  Heat  []  Ice massage Position:  Location:    []  Laser  []  Other: Position:  Location:      []  Vasopneumatic Device Pressure:       [] lo [] med [] hi   Temperature:      [x] Skin assessment post-treatment:  [x]intact []redness- no adverse reaction    []redness  adverse reaction:     40 min Therapeutic Exercise:  [x] See flow sheet :   Rationale: increase ROM, increase strength, improve coordination, improve balance and increase proprioception to improve the patients ability to lift, carry, reach, turn to look over shoulder and complete ADL's    15 min Manual Therapy: MFR Levator, UT, cervical paraspinals, scapular border, scalenes     Rationale: decrease pain, increase ROM and decrease trigger points to improve the patients ability to lift, carry, reach, turn to look over shoulder and complete ADL's     With   [] TE   [] TA   [] neuro   [] other: Patient Education: [x] Review HEP    [] Progressed/Changed HEP based on:   [] positioning   [] body mechanics   [] transfers   [] heat/ice application    [] other:      Other Objective/Functional Measures: modification required with wall push ups due to increased pain through right wrist, no pain when at high/low table, Increased tissue turgor L>R UT, levators, no rib upslip present    Pain Level (0-10 scale) post treatment: 1    ASSESSMENT/Changes in Function:     Patient will continue to benefit from skilled PT services to modify and progress therapeutic interventions, address functional mobility deficits, address ROM deficits, address strength deficits, analyze and address soft tissue restrictions, analyze and cue movement patterns, analyze and modify body mechanics/ergonomics and assess and modify postural abnormalities to attain remaining goals. []  See Plan of Care  []  See progress note/recertification  []  See Discharge Summary         Progress towards goals / Updated goals:  Patient demonstrates good tolerance for advanced interventions with min verbal cues for proper exercise reproduction.      PLAN  [x]  Upgrade activities as tolerated     [x]  Continue plan of care  [x]  Update interventions per flow sheet       []  Discharge due to:_  []  Other:_      Laura Wood 6/6/2019

## 2019-06-10 ENCOUNTER — HOSPITAL ENCOUNTER (OUTPATIENT)
Dept: PHYSICAL THERAPY | Age: 64
Discharge: HOME | End: 2019-06-10
Payer: MEDICARE

## 2019-06-10 PROCEDURE — 97110 THERAPEUTIC EXERCISES: CPT

## 2019-06-10 PROCEDURE — 97014 ELECTRIC STIMULATION THERAPY: CPT

## 2019-06-10 PROCEDURE — 97140 MANUAL THERAPY 1/> REGIONS: CPT

## 2019-06-10 NOTE — PROGRESS NOTES
PT DAILY TREATMENT NOTE - [facility] 2-15    Patient Name: Marcelino Sims  Date:6/10/2019  : 1955  [x]  Patient  Verified  Payor: BLUE CROSS MEDICARE / [place]: [place] / Product Type: Managed Care Medicare /    In time:11:30a  Out time:12:40p  Total Treatment Time (min): 70  Total Timed Codes (min): 55  1:1 Treatment Time ( [address] only): 40   Visit #:  3    Treatment Area: Pain in left shoulder [M25.512]    SUBJECTIVE  Pain Level (0-10 scale): 2  Any medication changes, allergies to medications, adverse drug reactions, diagnosis change, or new procedure performed?: [x] No    [] Yes (see summary sheet for update)  Subjective functional status/changes:   [] No changes reported  Patient reports she has felt good since last session and feels the manual work really helped. Patient reports she will be out after this week for vacation to [facility] for 2 weeks.        OBJECTIVE    Modality rationale: decrease pain and increase tissue extensibility to improve the patients ability to lift, carry, reach, turn to look over shoulder and complete ADL's   Min Type Additional Details      15 [x] Estim: []Att   [x]Unatt    []TENS instruct                  [x]IFC  []Premod   []NMES                     []Other:  []w/US   []w/ice   [x]w/heat  Position:supine  Location:L shoulder, neck       []  Traction: [] Cervical       []Lumbar                       [] Prone          []Supine                       []Intermittent   []Continuous Lbs:  [] before manual  [] after manual  []w/heat    []  Ultrasound: []Continuous   [] Pulsed                       at: []1MHz   []3MHz Location:  W/cm2:    [] Paraffin         Location:   []w/heat    []  Ice     []  Heat  []  Ice massage Position:  Location:    []  Laser  []  Other: Position:  Location:      []  Vasopneumatic Device Pressure:       [] lo [] med [] hi   Temperature:      [x] Skin assessment post-treatment:  [x]intact []redness- no adverse reaction    []redness  adverse reaction: 40 min Therapeutic Exercise:  [x] See flow sheet :   Rationale: increase ROM, increase strength, improve coordination, improve balance and increase proprioception to improve the patients ability to lift, carry, reach, turn to look over shoulder and complete ADL's    15 min Manual Therapy: MFR Levator, UT, cervical paraspinals, scapular border, scalenes     Rationale: decrease pain, increase ROM and decrease trigger points to improve the patients ability to lift, carry, reach, turn to look over shoulder and complete ADL's     With   [] TE   [] TA   [] neuro   [] other: Patient Education: [x] Review HEP    [] Progressed/Changed HEP based on:   [] positioning   [] body mechanics   [] transfers   [] heat/ice application    [] other:      Other Objective/Functional Measures:     Pain Level (0-10 scale) post treatment: 0    ASSESSMENT/Changes in Function:     Patient will continue to benefit from skilled PT services to modify and progress therapeutic interventions, address functional mobility deficits, address ROM deficits, address strength deficits, analyze and address soft tissue restrictions, analyze and cue movement patterns, analyze and modify body mechanics/ergonomics and assess and modify postural abnormalities to attain remaining goals. []  See Plan of Care  []  See progress note/recertification  []  See Discharge Summary         Progress towards goals / Updated goals:   Patient demonstrates good tolerance for advanced interventions with min verbal cues for proper exercise reproduction.      PLAN  [x]  Upgrade activities as tolerated     [x]  Continue plan of care  [x]  Update interventions per flow sheet       []  Discharge due to:_  []  Other:Jesus Sutton 6/10/2019

## 2019-06-12 ENCOUNTER — HOSPITAL ENCOUNTER (OUTPATIENT)
Dept: PHYSICAL THERAPY | Age: 64
Discharge: HOME | End: 2019-06-12
Payer: MEDICARE

## 2019-06-12 PROCEDURE — 97110 THERAPEUTIC EXERCISES: CPT | Performed by: PHYSICAL THERAPIST

## 2019-06-12 PROCEDURE — 97140 MANUAL THERAPY 1/> REGIONS: CPT

## 2019-06-12 PROCEDURE — 97014 ELECTRIC STIMULATION THERAPY: CPT | Performed by: PHYSICAL THERAPIST

## 2019-06-12 PROCEDURE — 97110 THERAPEUTIC EXERCISES: CPT

## 2019-06-12 PROCEDURE — 97140 MANUAL THERAPY 1/> REGIONS: CPT | Performed by: PHYSICAL THERAPIST

## 2019-06-12 NOTE — PROGRESS NOTES
PT DAILY TREATMENT NOTE - [facility] 2-15    Patient Name: Marina Sims  Date:2019  : 1955  [x]  Patient  Verified  Payor: BLUE CROSS MEDICARE / Plan: [place] [employer] [place] MEDICARE PPO / Product Type: Managed Care Medicare /    In time:1:05  Out time:2:00  Total Treatment Time (min): 55  Total Timed Codes (min): 40  1:1 Treatment Time ( [address] only): 30   Visit #:  4    Treatment Area: Pain in left shoulder [M25.512]    SUBJECTIVE  Pain Level (0-10 scale): 1  Any medication changes, allergies to medications, adverse drug reactions, diagnosis change, or new procedure performed?: [x] No    [] Yes (see summary sheet for update)  Subjective functional status/changes:   [] No changes reported  Pt reports that she is feeling much better and wants to continue with the manual therapy. She leaves Monday for a trip to [facility] for 2 weeks.      OBJECTIVE    Modality rationale: decrease edema, decrease inflammation and decrease pain to improve the patients ability to turn her head without pain    Min Type Additional Details      15 [x] Estim: []Att   []Unatt    []TENS instruct                  [x]IFC  []Premod   []NMES                     []Other:  []w/US   []w/ice   [x]w/heat  Position: supine  Location: bilateral upper trapezius       []  Traction: [] Cervical       []Lumbar                       [] Prone          []Supine                       []Intermittent   []Continuous Lbs:  [] before manual  [] after manual  []w/heat    []  Ultrasound: []Continuous   [] Pulsed                       at: []1MHz   []3MHz Location:  W/cm2:    [] Paraffin         Location:   []w/heat    []  Ice     []  Heat  []  Ice massage Position:  Location:    []  Laser  []  Other: Position:  Location:      []  Vasopneumatic Device Pressure:       [] lo [] med [] hi   Temperature:      [x] Skin assessment post-treatment:  [x]intact []redness- no adverse reaction    []redness  adverse reaction:      25 min Therapeutic Exercise:  [] See flow sheet : Rationale: increase ROM and increase strength to improve the patients ability to sit for long periods of time without neck pain     10 min Manual Therapy: MFR Levator, UT, cervical paraspinals, scapular border, scalenes     Rationale: decrease pain and decrease trigger points to improve the patients ability to turn her head without neck pain           With   [] TE   [] TA   [] neuro   [] other: Patient Education: [x] Review HEP    [] Progressed/Changed HEP based on:   [] positioning   [] body mechanics   [] transfers   [] heat/ice application    [] other:      Other Objective/Functional Measures:      Pain Level (0-10 scale) post treatment: 0    ASSESSMENT/Changes in Function:   Pt tolerated increased intensity of upper extremity exercises well with no increase in neck pain   Patient will continue to benefit from skilled PT services to modify and progress therapeutic interventions, address functional mobility deficits, address ROM deficits, address strength deficits and analyze and address soft tissue restrictions to attain remaining goals. []  See Plan of Care  []  See progress note/recertification  []  See Discharge Summary         Progress towards goals / Updated goals:  Pt is progressing towards goals with notable increase in range of motion with decreased reported pain and no functional limitations. Will re-assess goals after pt returns from [facility] in 2 weeks.      PLAN   []  Upgrade activities as tolerated     [x]  Continue plan of care  []  Update interventions per flow sheet       []  Discharge due to:_  []  Other:_      Deloris Jules 6/12/2019

## 2019-06-14 ENCOUNTER — APPOINTMENT (OUTPATIENT)
Dept: PHYSICAL THERAPY | Age: 64
End: 2019-06-14
Payer: MEDICARE

## 2019-07-08 ENCOUNTER — HOSPITAL ENCOUNTER (OUTPATIENT)
Dept: PHYSICAL THERAPY | Age: 64
Discharge: HOME | End: 2019-07-08
Payer: MEDICARE

## 2019-07-08 PROCEDURE — 97140 MANUAL THERAPY 1/> REGIONS: CPT

## 2019-07-08 PROCEDURE — 97110 THERAPEUTIC EXERCISES: CPT

## 2019-07-08 NOTE — PROGRESS NOTES
PT DAILY TREATMENT NOTE - [facility] 2-15    Patient Name: Lopez Sims  Date:2019  : 1955  [x]  Patient  Verified  Payor: BLUE CROSS MEDICARE / Plan: [place] [employer] CROSS MEDICARE PPO / Product Type: Managed Care Medicare /    In time:11:00a Out time:11:40a  Total Treatment Time (min): 40  Total Timed Codes (min): 40  1:1 Treatment Time ([facility] only): 25   Visit #:  5    Treatment Area: Pain in left shoulder [M25.512]    SUBJECTIVE  Pain Level (0-10 scale): 0  Any medication changes, allergies to medications, adverse drug reactions, diagnosis change, or new procedure performed?: [x] No    [] Yes (see summary sheet for update)  Subjective functional status/changes:   [] No changes reported  Patient reports she has not had any pain since last visit and notice the tightness and tenderness in her neck is gone. Patient feels ready for discharge today.     OBJECTIVE    Modality rationale: declined   Min Type Additional Details      15 [x] Estim: []Att   []Unatt    []TENS instruct                  [x]IFC  []Premod   []NMES                     []Other:  []w/US   []w/ice   [x]w/heat  Position: supine  Location: bilateral upper trapezius       []  Traction: [] Cervical       []Lumbar                       [] Prone          []Supine                       []Intermittent   []Continuous Lbs:  [] before manual  [] after manual  []w/heat    []  Ultrasound: []Continuous   [] Pulsed                       at: []1MHz   []3MHz Location:  W/cm2:    [] Paraffin         Location:   []w/heat    []  Ice     []  Heat  []  Ice massage Position:  Location:    []  Laser  []  Other: Position:  Location:      []  Vasopneumatic Device Pressure:       [] lo [] med [] hi   Temperature:      [x] Skin assessment post-treatment:  [x]intact []redness- no adverse reaction    []redness  adverse reaction:      30 min Therapeutic Exercise:  [x] See flow sheet : reviewed progression to gym program at home   Rationale: increase ROM and increase strength to improve the patients ability to sit for long periods of time without neck pain     10 min Manual Therapy: MFR Levator, UT, cervical paraspinals, scapular border, scalenes     Rationale: decrease pain and decrease trigger points to improve the patients ability to turn her head without neck pain           With   [] TE   [] TA   [] neuro   [] other: Patient Education: [x] Review HEP    [] Progressed/Changed HEP based on:   [] positioning   [] body mechanics   [] transfers   [] heat/ice application    [] other:      Other Objective/Functional Measures:      Pain Level (0-10 scale) post treatment: 0    ASSESSMENT/Changes in Function:        []  See Plan of Care  []  See progress note/recertification  [x]  See Discharge Summary         Progress towards goals / Updated goals: Patient has made great progress with improved AROM, strength and ability to complete ADL with no pain. Patient has started returning to gym program with no pain. Patient has met all short and long term goals and has maximized therapeutic benefits at this time. Patient will do well with gym strengthening and aquatics program to maintain gains achieved in therapy. Short Term Goals: To be accomplished in 8 treatments: Met  Pt will be able to turn her head to the L WNL with 0/10 pain to improve ability to drive a car  Pt will be report being able to sit for 30 minutes with 0/10 pain in neck   Pt will be able to look down WNL with 0/10 pain in neck to improve ability to look down and tie shoe  Long Term Goals: To be accomplished in 16 treatments: Met  Pt will report being able to to complete a weight training session with 0/10 pain in neck  Pt will report being able to sit for 1 hour with 0/10 pain in neck   Pt will report being able walk 1 mile with 0/10 pain in neck  Frequency / Duration: Patient to be seen 2 times per week for 8 weeks.         PLAN   []  Upgrade activities as tolerated     []  Continue plan of care  []  Update interventions per flow sheet       [x]  Discharge due to:_  []  Other:_      Sanjuanita [facility] 7/8/2019

## 2019-07-08 NOTE — PROGRESS NOTES
[address] [facility] [employer] [facility] Jonatan Pearson 56  Phone: [phone] Fax: [phone]      Discharge Summary 2-15    Patient name: Baron Fay  : 1955  Provider#: [id number]  Referral source: Suzette Pike MD      Medical/Treatment Diagnosis: Pain in left shoulder [M25.512]     Prior Hospitalization: see medical history     Comorbidities: See Plan of Care  Prior Level of Function: See Plan of Care  Medications: Verified on Patient Summary List    Start of Care: 19      Onset Date:insidious   Visits from Start of Care: 5     Missed Visits: 0  Reporting Period : 19 to 19    Assessment/Summary of care:    Patient has made great progress with improved AROM, strength and ability to complete ADL with no pain. Patient has started returning to gym program with no pain. Patient has met all short and long term goals and has maximized therapeutic benefits at this time. Patient will do well with gym strengthening and aquatics program to maintain gains achieved in therapy.     Short Term Goals: To be accomplished in 8 treatments: Met  Pt will be able to turn her head to the L WNL with 0/10 pain to improve ability to drive a car  Pt will be report being able to sit for 30 minutes with 0/10 pain in neck   Pt will be able to look down WNL with 0/10 pain in neck to improve ability to look down and tie shoe  Long Term Goals: To be accomplished in 16 treatments: Met  Pt will report being able to to complete a weight training session with 0/10 pain in neck  Pt will report being able to sit for 1 hour with 0/10 pain in neck   Pt will report being able walk 1 mile with 0/10 pain in neck  Frequency / Duration: Patient to be seen 2 times per week for 8 weeks.           RECOMMENDATIONS:  [x]Discontinue therapy: [x]Patient has reached or is progressing toward set goals     []Patient is non-compliant or has abdicated     []Due to lack of appreciable progress towards set goals     []Other  Sampson Bamberger, PT 7/8/2019

## 2019-12-16 ENCOUNTER — HOSPITAL ENCOUNTER (OUTPATIENT)
Dept: MAMMOGRAPHY | Age: 64
Discharge: HOME | End: 2019-12-16
Payer: MEDICARE

## 2019-12-16 DIAGNOSIS — R92.8 ABNORMAL SCREENING MAMMOGRAM: ICD-10-CM

## 2019-12-16 PROCEDURE — 77067 SCR MAMMO BI INCL CAD: CPT

## 2020-11-18 ENCOUNTER — OFFICE VISIT (OUTPATIENT)
Dept: INTERNAL MEDICINE CLINIC | Age: 65
End: 2020-11-18
Payer: MEDICARE

## 2020-11-18 VITALS
BODY MASS INDEX: 23.7 KG/M2 | OXYGEN SATURATION: 98 % | SYSTOLIC BLOOD PRESSURE: 110 MMHG | HEART RATE: 70 BPM | RESPIRATION RATE: 16 BRPM | TEMPERATURE: 98.2 F | WEIGHT: 138.8 LBS | HEIGHT: 64 IN | DIASTOLIC BLOOD PRESSURE: 72 MMHG

## 2020-11-18 DIAGNOSIS — Z12.31 VISIT FOR SCREENING MAMMOGRAM: ICD-10-CM

## 2020-11-18 DIAGNOSIS — D72.9 ABNORMAL WHITE BLOOD CELL (WBC) COUNT: Primary | ICD-10-CM

## 2020-11-18 DIAGNOSIS — Z23 ENCOUNTER FOR IMMUNIZATION: ICD-10-CM

## 2020-11-18 DIAGNOSIS — R29.898 RIGHT HAND WEAKNESS: ICD-10-CM

## 2020-11-18 DIAGNOSIS — Z00.00 MEDICARE ANNUAL WELLNESS VISIT, INITIAL: Primary | ICD-10-CM

## 2020-11-18 LAB
ALBUMIN SERPL-MCNC: 3.8 G/DL (ref 3.5–5)
ALBUMIN/GLOB SERPL: 1.2 {RATIO} (ref 1.1–2.2)
ALP SERPL-CCNC: 71 U/L (ref 45–117)
ALT SERPL-CCNC: 17 U/L (ref 12–78)
ANION GAP SERPL CALC-SCNC: 4 MMOL/L (ref 5–15)
AST SERPL-CCNC: 17 U/L (ref 15–37)
BILIRUB SERPL-MCNC: 0.5 MG/DL (ref 0.2–1)
BUN SERPL-MCNC: 18 MG/DL (ref 6–20)
BUN/CREAT SERPL: 20 (ref 12–20)
CALCIUM SERPL-MCNC: 8.9 MG/DL (ref 8.5–10.1)
CHLORIDE SERPL-SCNC: 108 MMOL/L (ref 97–108)
CHOLEST SERPL-MCNC: 223 MG/DL
CO2 SERPL-SCNC: 26 MMOL/L (ref 21–32)
CREAT SERPL-MCNC: 0.92 MG/DL (ref 0.55–1.02)
ERYTHROCYTE [DISTWIDTH] IN BLOOD BY AUTOMATED COUNT: 12.6 % (ref 11.5–14.5)
GLOBULIN SER CALC-MCNC: 3.1 G/DL (ref 2–4)
GLUCOSE SERPL-MCNC: 80 MG/DL (ref 65–100)
HCT VFR BLD AUTO: 41.9 % (ref 35–47)
HDLC SERPL-MCNC: 97 MG/DL
HDLC SERPL: 2.3 {RATIO} (ref 0–5)
HGB BLD-MCNC: 13.5 G/DL (ref 11.5–16)
LDLC SERPL CALC-MCNC: 114.2 MG/DL (ref 0–100)
LIPID PROFILE,FLP: ABNORMAL
MCH RBC QN AUTO: 29.3 PG (ref 26–34)
MCHC RBC AUTO-ENTMCNC: 32.2 G/DL (ref 30–36.5)
MCV RBC AUTO: 90.9 FL (ref 80–99)
NRBC # BLD: 0 K/UL (ref 0–0.01)
NRBC BLD-RTO: 0 PER 100 WBC
PLATELET # BLD AUTO: 183 K/UL (ref 150–400)
PMV BLD AUTO: 11.5 FL (ref 8.9–12.9)
POTASSIUM SERPL-SCNC: 4.6 MMOL/L (ref 3.5–5.1)
PROT SERPL-MCNC: 6.9 G/DL (ref 6.4–8.2)
RBC # BLD AUTO: 4.61 M/UL (ref 3.8–5.2)
SODIUM SERPL-SCNC: 138 MMOL/L (ref 136–145)
TRIGL SERPL-MCNC: 59 MG/DL (ref ?–150)
VLDLC SERPL CALC-MCNC: 11.8 MG/DL
WBC # BLD AUTO: 3 K/UL (ref 3.6–11)

## 2020-11-18 PROCEDURE — G8399 PT W/DXA RESULTS DOCUMENT: HCPCS | Performed by: INTERNAL MEDICINE

## 2020-11-18 PROCEDURE — G9899 SCRN MAM PERF RSLTS DOC: HCPCS | Performed by: INTERNAL MEDICINE

## 2020-11-18 PROCEDURE — G8427 DOCREV CUR MEDS BY ELIG CLIN: HCPCS | Performed by: INTERNAL MEDICINE

## 2020-11-18 PROCEDURE — G0439 PPPS, SUBSEQ VISIT: HCPCS | Performed by: INTERNAL MEDICINE

## 2020-11-18 PROCEDURE — 1101F PT FALLS ASSESS-DOCD LE1/YR: CPT | Performed by: INTERNAL MEDICINE

## 2020-11-18 PROCEDURE — G8510 SCR DEP NEG, NO PLAN REQD: HCPCS | Performed by: INTERNAL MEDICINE

## 2020-11-18 PROCEDURE — G8420 CALC BMI NORM PARAMETERS: HCPCS | Performed by: INTERNAL MEDICINE

## 2020-11-18 PROCEDURE — 3017F COLORECTAL CA SCREEN DOC REV: CPT | Performed by: INTERNAL MEDICINE

## 2020-11-18 PROCEDURE — G8536 NO DOC ELDER MAL SCRN: HCPCS | Performed by: INTERNAL MEDICINE

## 2020-11-18 PROCEDURE — 90732 PPSV23 VACC 2 YRS+ SUBQ/IM: CPT

## 2020-11-18 PROCEDURE — G0009 ADMIN PNEUMOCOCCAL VACCINE: HCPCS

## 2020-11-18 RX ORDER — CHOLECALCIFEROL (VITAMIN D3) 50 MCG
CAPSULE ORAL
COMMUNITY

## 2020-11-18 NOTE — PROGRESS NOTES
This is an Initial Medicare Annual Wellness Exam (AWV) (Performed 12 months after IPPE or effective date of Medicare Part B enrollment, Once in a lifetime)    I have reviewed the patient's medical history in detail and updated the computerized patient record. Depression Risk Factor Screening:     3 most recent PHQ Screens 11/18/2020   Little interest or pleasure in doing things Not at all   Feeling down, depressed, irritable, or hopeless Not at all   Total Score PHQ 2 0       Alcohol Risk Screen   Do you average more than 1 drink per night or more than 7 drinks a week:  No    On any one occasion in the past three months have you have had more than 3 drinks containing alcohol:  Yes         Functional Ability and Level of Safety:   Hearing: Hearing is good. Activities of Daily Living: The home contains: no safety equipment. Patient does total self care    Ambulation: with no difficulty      Fall Risk:  Fall Risk Assessment, last 12 mths 11/18/2020   Able to walk? Yes   Fall in past 12 months? No     Abuse Screen:  Patient is not abused       Cognitive Screening   Has your family/caregiver stated any concerns about your memory: no     Cognitive Screening: Normal - MMSE (Mini Mental Status Exam)    Assessment/Plan   Education and counseling provided:  Are appropriate based on today's review and evaluation    Diagnoses and all orders for this visit:    1.  Medicare annual wellness visit, initial         Health Maintenance Due     Health Maintenance Due   Topic Date Due    Pneumococcal 65+ years (1 of 1 - PPSV23) 04/21/2019    GLAUCOMA SCREENING Q2Y  08/14/2019    Medicare Yearly Exam  05/14/2020    Shingrix Vaccine Age 50> (2 of 2) 11/18/2020       Patient Care Team   Patient Care Team:  Omero Richard MD as PCP - General (Internal Medicine)  Omero Richard MD as PCP - [facility]  Kyung Naylor MD (Obstetrics & Gynecology)    History     Patient Active Problem List   Diagnosis Code    Urinary incontinence R32    Prolapse of anterior vaginal wall N81.10     Past Medical History:   Diagnosis Date    H/O mammogram 11/15/2017    Negative    Hx of mammogram 8/20/2014;08/05/2016    normal; negative    Pap smear for cervical cancer screening 07/28/2010    negative, HPV negative    Pap smear for cervical cancer screening 08/04/2016    negative, HPV negative      Past Surgical History:   Procedure Laterality Date    ENDOSCOPY, COLON, DIAGNOSTIC      2011 normal    HX ORTHOPAEDIC      foot surgeries     Current Outpatient Medications   Medication Sig Dispense Refill    B.infantis-B.ani-B.long-B.bifi (Probiotic 4X) 10-15 mg TbEC Take  by mouth. Taken every other day      acyclovir (ZOVIRAX) 5 % ointment Apply  to affected area every three (3) hours. 2 g 5    OM3-DHA/EPA/D3/LUTEIN/ZEAZANTH (EYE OMEGA ADVANTAGE PO) Take 2,000 mg by mouth two (2) times a day.  PSYLLIUM HUSK/CALCIUM CARB (PSYLLIUM HUSK-CALCIUM PO) Take  by mouth. No Known Allergies    Family History   Problem Relation Age of Onset    Heart Disease Mother     Cancer Mother         endometrial    Hypertension Mother     Hypertension Father     Cancer Sister         rectal    Breast Cancer Other 61        paternal cousin , triple neg     Social History     Tobacco Use    Smoking status: Never Smoker    Smokeless tobacco: Never Used   Substance Use Topics    Alcohol use:  Yes

## 2020-11-18 NOTE — PROGRESS NOTES
HISTORY OF PRESENT ILLNESS  Yuridia Sims is a 76 y.o. female. HPI   R hand weakness: Pt reports that he is experiencing R hand weakness (thumb). She notes that she has a hard time opening jars and other tasks with . She states that she had surgery in the past and does not want surgery again. Pt reports that she recently moved and walks more. Health maintenance: Pt reports that she is not UTD with pneumonia shot. She notes that she had UTD with 1 dose of Shingrix. She states that she is not UTD with mammogram. Pt notes that she plans on getting L foot surgery in the future since she is unable to walk barefoot and is now experiencing ankle strain. Review of Systems   All other systems reviewed and are negative. Physical Exam  Vitals signs and nursing note reviewed. Constitutional:       Appearance: Normal appearance. She is normal weight. HENT:      Head: Normocephalic and atraumatic. Right Ear: Tympanic membrane, ear canal and external ear normal.      Left Ear: Tympanic membrane, ear canal and external ear normal.      Nose: Nose normal.      Mouth/Throat:      Mouth: Mucous membranes are dry. Pharynx: Oropharynx is clear. Neck:      Musculoskeletal: Normal range of motion and neck supple. Cardiovascular:      Rate and Rhythm: Normal rate and regular rhythm. Pulses: Normal pulses. Heart sounds: Normal heart sounds. Pulmonary:      Effort: Pulmonary effort is normal.      Breath sounds: Normal breath sounds. Abdominal:      General: Abdomen is flat. Palpations: Abdomen is soft. Musculoskeletal: Normal range of motion. Skin:     General: Skin is warm and dry. Neurological:      General: No focal deficit present. Mental Status: She is alert and oriented to person, place, and time. Mental status is at baseline. Psychiatric:         Mood and Affect: Mood normal.         Behavior: Behavior normal.         Thought Content:  Thought content normal. Judgment: Judgment normal.         ASSESSMENT and PLAN  Diagnoses and all orders for this visit:    1. Medicare annual wellness visit, initial  -     CBC W/O DIFF; Future  -     LIPID PANEL; Future  -     METABOLIC PANEL, COMPREHENSIVE; Future    2. Encounter for immunization  Administered pneumonia injection today in clinic.  -     PNEUMOCOCCAL POLYSACCHARIDE VACCINE, 23-VALENT, ADULT OR IMMUNOSUPPRESSED PT DOSE,    3. Visit for screening mammogram  Reminded pt that she is due for her mammogram. Waiting on scan results. -     [facility] 3D SIMEON W MAMMO BI SCREENING INCL CAD; Future    4. Right hand weakness  Pt presents with R hand weakness. Discussed with pt that strengthening exercises will help with her . Referred pt to PT. Will continue to monitor for improvements or changes. Lab results and schedule of future lab studies reviewed with patient. Reviewed diet, exercise and weight control. Written by Freedom Pope, as dictated by Espinoza Moncada MD.     Current diagnosis and concerns discussed with pt at length. Understands risks and benefits or current treatment plan and medications and accepts the treatment and medication with any possible risks. Pt asks appropriate questions which were answered. Pt instructed to call with any concerns or problems.

## 2020-11-18 NOTE — PATIENT INSTRUCTIONS
Medicare Wellness Visit, Female The best way to live healthy is to have a lifestyle where you eat a well-balanced diet, exercise regularly, limit alcohol use, and quit all forms of tobacco/nicotine, if applicable. Regular preventive services are another way to keep healthy. Preventive services (vaccines, screening tests, monitoring & exams) can help personalize your care plan, which helps you manage your own care. Screening tests can find health problems at the earliest stages, when they are easiest to treat. [facility] follows the current, evidence-based guidelines published by the [facility] Frey ([facility]) when recommending preventive services for our patients. Because we follow these guidelines, sometimes recommendations change over time as research supports it. (For example, mammograms used to be recommended annually. Even though Medicare will still pay for an annual mammogram, the newer guidelines recommend a mammogram every two years for women of average risk). Of course, you and your doctor may decide to screen more often for some diseases, based on your risk and your co-morbidities (chronic disease you are already diagnosed with). Preventive services for you include: - Medicare offers their members a free annual wellness visit, which is time for you and your primary care provider to discuss and plan for your preventive service needs. Take advantage of this benefit every year! 
-All adults over the age of 72 should receive the recommended pneumonia vaccines. Current USPSTF guidelines recommend a series of two vaccines for the best pneumonia protection.  
-All adults should have a flu vaccine yearly and a tetanus vaccine every 10 years.  
-All adults age 48 and older should receive the shingles vaccines (series of two vaccines). -All adults age 38-68 who are overweight should have a diabetes screening test once every three years. -All adults born between [birth year] and 1965 should be screened once for Hepatitis C. 
-Other screening tests and preventive services for persons with diabetes include: an eye exam to screen for diabetic retinopathy, a kidney function test, a foot exam, and stricter control over your cholesterol.  
-Cardiovascular screening for adults with routine risk involves an electrocardiogram (ECG) at intervals determined by your doctor.  
-Colorectal cancer screenings should be done for adults age 54-65 with no increased risk factors for colorectal cancer. There are a number of acceptable methods of screening for this type of cancer. Each test has its own benefits and drawbacks. Discuss with your doctor what is most appropriate for you during your annual wellness visit. The different tests include: colonoscopy (considered the best screening method), a fecal occult blood test, a fecal DNA test, and sigmoidoscopy. 
 
-A bone mass density test is recommended when a woman turns 65 to screen for osteoporosis. This test is only recommended one time, as a screening. Some providers will use this same test as a disease monitoring tool if you already have osteoporosis. -Breast cancer screenings are recommended every other year for women of normal risk, age 54-69. 
-Cervical cancer screenings for women over age 72 are only recommended with certain risk factors. Here is a list of your current Health Maintenance items (your personalized list of preventive services) with a due date: 
Health Maintenance Due Topic Date Due  Pneumococcal Vaccine (1 of 1 - PPSV23) 04/21/2019  Glaucoma Screening   08/14/2019 [facility] Annual Well Visit  05/14/2020  Shingles Vaccine (2 of 2) 11/18/2020

## 2020-12-16 LAB
BASOPHILS # BLD AUTO: 0 X10E3/UL (ref 0–0.2)
BASOPHILS NFR BLD AUTO: 1 %
EOSINOPHIL # BLD AUTO: 0.1 X10E3/UL (ref 0–0.4)
EOSINOPHIL NFR BLD AUTO: 2 %
ERYTHROCYTE [DISTWIDTH] IN BLOOD BY AUTOMATED COUNT: 12.2 % (ref 11.7–15.4)
HCT VFR BLD AUTO: 40.7 % (ref 34–46.6)
HGB BLD-MCNC: 13.7 G/DL (ref 11.1–15.9)
IMM GRANULOCYTES # BLD AUTO: 0 X10E3/UL (ref 0–0.1)
IMM GRANULOCYTES NFR BLD AUTO: 0 %
LYMPHOCYTES # BLD AUTO: 1.2 X10E3/UL (ref 0.7–3.1)
LYMPHOCYTES NFR BLD AUTO: 29 %
MCH RBC QN AUTO: 29.7 PG (ref 26.6–33)
MCHC RBC AUTO-ENTMCNC: 33.7 G/DL (ref 31.5–35.7)
MCV RBC AUTO: 88 FL (ref 79–97)
MONOCYTES # BLD AUTO: 0.4 X10E3/UL (ref 0.1–0.9)
MONOCYTES NFR BLD AUTO: 11 %
NEUTROPHILS # BLD AUTO: 2.4 X10E3/UL (ref 1.4–7)
NEUTROPHILS NFR BLD AUTO: 57 %
PLATELET # BLD AUTO: 187 X10E3/UL (ref 150–450)
RBC # BLD AUTO: 4.61 X10E6/UL (ref 3.77–5.28)
WBC # BLD AUTO: 4.2 X10E3/UL (ref 3.4–10.8)

## 2021-01-18 ENCOUNTER — TELEPHONE (OUTPATIENT)
Dept: INTERNAL MEDICINE CLINIC | Age: 66
End: 2021-01-18

## 2021-01-18 NOTE — TELEPHONE ENCOUNTER
----- Message from [facility] sent at 1/18/2021  3:27 PM EST -----  Regarding: Dr. Jude Prabhakar first and last name: Ruth Spencer      Reason for call:Missing ICD 10 diagnostic code      Callback required yes/no and why: yes      Best contact number(s): [phone]      Details to clarify the request : ref #: [id number]      Andrei Pall

## 2021-01-20 ENCOUNTER — HOSPITAL ENCOUNTER (OUTPATIENT)
Dept: MAMMOGRAPHY | Age: 66
Discharge: HOME | End: 2021-01-20
Attending: INTERNAL MEDICINE
Payer: MEDICARE

## 2021-01-20 DIAGNOSIS — Z12.31 VISIT FOR SCREENING MAMMOGRAM: ICD-10-CM

## 2021-01-20 PROCEDURE — 77063 BREAST TOMOSYNTHESIS BI: CPT

## 2022-01-20 ENCOUNTER — TRANSCRIBE ORDER (OUTPATIENT)
Dept: SCHEDULING | Age: 67
End: 2022-01-20

## 2022-01-20 DIAGNOSIS — Z12.31 VISIT FOR SCREENING MAMMOGRAM: Primary | ICD-10-CM

## 2022-02-25 NOTE — PROGRESS NOTES
Yuridia Sims (: 1955) is a 78 y.o. female, established patient, here for evaluation of the following chief complaint(s):  No chief complaint on file. ASSESSMENT/PLAN:  Below is the assessment and plan developed based on review of pertinent history, physical exam, labs, studies, and medications. {There are no diagnoses linked to this encounter. (Refresh or delete this SmartLink)}    No follow-ups on file. SUBJECTIVE/OBJECTIVE:  HPI      Review of Systems   All other systems reviewed and are negative. Physical Exam  Constitutional:       Appearance: Normal appearance. HENT:      Right Ear: Tympanic membrane and external ear normal.      Left Ear: Tympanic membrane and external ear normal.      Mouth/Throat:      Mouth: Mucous membranes are moist.      Pharynx: Oropharynx is clear. Cardiovascular:      Rate and Rhythm: Normal rate and regular rhythm. Pulses: Normal pulses. Heart sounds: Normal heart sounds. Pulmonary:      Effort: Pulmonary effort is normal.      Breath sounds: Normal breath sounds. Musculoskeletal:         General: Normal range of motion. Skin:     General: Skin is warm and dry. Neurological:      General: No focal deficit present. Mental Status: She is alert and oriented to person, place, and time. Psychiatric:         Mood and Affect: Mood normal.         Behavior: Behavior normal.       On this date 2022 I have spent *** minutes reviewing previous notes, test results and face to face with the patient discussing the diagnosis and importance of compliance with the treatment plan as well as documenting on the day of the visit. An electronic signature was used to authenticate this note.

## 2022-02-28 ENCOUNTER — OFFICE VISIT (OUTPATIENT)
Dept: INTERNAL MEDICINE CLINIC | Age: 67
End: 2022-02-28
Payer: MEDICARE

## 2022-02-28 VITALS
HEART RATE: 83 BPM | OXYGEN SATURATION: 96 % | BODY MASS INDEX: 22.94 KG/M2 | WEIGHT: 134.4 LBS | RESPIRATION RATE: 16 BRPM | SYSTOLIC BLOOD PRESSURE: 109 MMHG | DIASTOLIC BLOOD PRESSURE: 67 MMHG | HEIGHT: 64 IN | TEMPERATURE: 99.1 F

## 2022-02-28 DIAGNOSIS — Z78.0 POST-MENOPAUSAL: ICD-10-CM

## 2022-02-28 DIAGNOSIS — Z00.00 MEDICARE ANNUAL WELLNESS VISIT, INITIAL: Primary | ICD-10-CM

## 2022-02-28 LAB
ALBUMIN SERPL-MCNC: 3.7 G/DL (ref 3.5–5)
ALBUMIN/GLOB SERPL: 1.2 {RATIO} (ref 1.1–2.2)
ALP SERPL-CCNC: 66 U/L (ref 45–117)
ALT SERPL-CCNC: 18 U/L (ref 12–78)
ANION GAP SERPL CALC-SCNC: 4 MMOL/L (ref 5–15)
AST SERPL-CCNC: 17 U/L (ref 15–37)
BILIRUB SERPL-MCNC: 0.6 MG/DL (ref 0.2–1)
BUN SERPL-MCNC: 17 MG/DL (ref 6–20)
BUN/CREAT SERPL: 20 (ref 12–20)
CALCIUM SERPL-MCNC: 9.2 MG/DL (ref 8.5–10.1)
CHLORIDE SERPL-SCNC: 106 MMOL/L (ref 97–108)
CHOLEST SERPL-MCNC: 230 MG/DL
CO2 SERPL-SCNC: 26 MMOL/L (ref 21–32)
CREAT SERPL-MCNC: 0.86 MG/DL (ref 0.55–1.02)
ERYTHROCYTE [DISTWIDTH] IN BLOOD BY AUTOMATED COUNT: 12.7 % (ref 11.5–14.5)
GLOBULIN SER CALC-MCNC: 3.1 G/DL (ref 2–4)
GLUCOSE SERPL-MCNC: 92 MG/DL (ref 65–100)
HCT VFR BLD AUTO: 40.1 % (ref 35–47)
HDLC SERPL-MCNC: 95 MG/DL
HDLC SERPL: 2.4 {RATIO} (ref 0–5)
HGB BLD-MCNC: 13.3 G/DL (ref 11.5–16)
LDLC SERPL CALC-MCNC: 118.6 MG/DL (ref 0–100)
MCH RBC QN AUTO: 30 PG (ref 26–34)
MCHC RBC AUTO-ENTMCNC: 33.2 G/DL (ref 30–36.5)
MCV RBC AUTO: 90.3 FL (ref 80–99)
NRBC # BLD: 0 K/UL (ref 0–0.01)
NRBC BLD-RTO: 0 PER 100 WBC
PLATELET # BLD AUTO: 186 K/UL (ref 150–400)
PMV BLD AUTO: 11 FL (ref 8.9–12.9)
POTASSIUM SERPL-SCNC: 4.5 MMOL/L (ref 3.5–5.1)
PROT SERPL-MCNC: 6.8 G/DL (ref 6.4–8.2)
RBC # BLD AUTO: 4.44 M/UL (ref 3.8–5.2)
SODIUM SERPL-SCNC: 136 MMOL/L (ref 136–145)
TRIGL SERPL-MCNC: 82 MG/DL (ref ?–150)
VLDLC SERPL CALC-MCNC: 16.4 MG/DL
WBC # BLD AUTO: 3 K/UL (ref 3.6–11)

## 2022-02-28 PROCEDURE — G9899 SCRN MAM PERF RSLTS DOC: HCPCS | Performed by: INTERNAL MEDICINE

## 2022-02-28 PROCEDURE — 1101F PT FALLS ASSESS-DOCD LE1/YR: CPT | Performed by: INTERNAL MEDICINE

## 2022-02-28 PROCEDURE — G8420 CALC BMI NORM PARAMETERS: HCPCS | Performed by: INTERNAL MEDICINE

## 2022-02-28 PROCEDURE — G8427 DOCREV CUR MEDS BY ELIG CLIN: HCPCS | Performed by: INTERNAL MEDICINE

## 2022-02-28 PROCEDURE — G8510 SCR DEP NEG, NO PLAN REQD: HCPCS | Performed by: INTERNAL MEDICINE

## 2022-02-28 PROCEDURE — 3017F COLORECTAL CA SCREEN DOC REV: CPT | Performed by: INTERNAL MEDICINE

## 2022-02-28 PROCEDURE — G0439 PPPS, SUBSEQ VISIT: HCPCS | Performed by: INTERNAL MEDICINE

## 2022-02-28 PROCEDURE — G8536 NO DOC ELDER MAL SCRN: HCPCS | Performed by: INTERNAL MEDICINE

## 2022-02-28 PROCEDURE — G8399 PT W/DXA RESULTS DOCUMENT: HCPCS | Performed by: INTERNAL MEDICINE

## 2022-02-28 NOTE — PROGRESS NOTES
This is the Subsequent Medicare Annual Wellness Exam, performed 12 months or more after the Initial AWV or the last Subsequent AWV    I have reviewed the patient's medical history in detail and updated the computerized patient record. She is going to gym and going to [facility] - she is aging and her hands do get stiff at times- she rarely takes an ibuprofen ; her energy level is lower and does water aerobics and exercising 4-5 times a week     She feels her uterine prolapse is getting worse and did PT but did not feel like it was getting worse     Assessment/Plan   Education and counseling provided:  Are appropriate based on today's review and evaluation    1. Medicare annual wellness visit, initial       Depression Risk Factor Screening     3 most recent PHQ Screens 2/28/2022   Little interest or pleasure in doing things Not at all   Feeling down, depressed, irritable, or hopeless Not at all   Total Score PHQ 2 0       Alcohol & Drug Abuse Risk Screen    Do you average more than 1 drink per night or more than 7 drinks a week:  No    On any one occasion in the past three months have you have had more than 3 drinks containing alcohol:  yes          Functional Ability and Level of Safety    Hearing: Hearing is good. Activities of Daily Living: The home contains: no safety equipment. Patient does total self care      Ambulation: with no difficulty     Fall Risk:  Fall Risk Assessment, last 12 mths 2/28/2022   Able to walk? Yes   Fall in past 12 months? 0   Do you feel unsteady?  0   Are you worried about falling 0      Abuse Screen:  Patient is not abused       Cognitive Screening    Has your family/caregiver stated any concerns about your memory: no     Cognitive Screening: Normal - MMSE (Mini Mental Status Exam)    Health Maintenance Due     Health Maintenance Due   Topic Date Due    Shingrix Vaccine Age 49> (2 of 2) 11/18/2020    Flu Vaccine (1) 09/01/2021    Depression Screen  11/18/2021    Medicare Yearly Exam  11/19/2021       Patient Care Team   Patient Care Team:  Marius Merlin, MD as PCP - General (Internal Medicine)  Marius Merlin, MD as PCP - [facility]  Laron Wisdom MD (Obstetrics & Gynecology)    History     Patient Active Problem List   Diagnosis Code    Urinary incontinence R32    Prolapse of anterior vaginal wall N81.10     Past Medical History:   Diagnosis Date    H/O mammogram 11/15/2017    Negative    Hx of mammogram 8/20/2014;08/05/2016    normal; negative    Pap smear for cervical cancer screening 07/28/2010    negative, HPV negative    Pap smear for cervical cancer screening 08/04/2016    negative, HPV negative      Past Surgical History:   Procedure Laterality Date    ENDOSCOPY, COLON, DIAGNOSTIC      2011 normal    HX ORTHOPAEDIC      foot surgeries     Current Outpatient Medications   Medication Sig Dispense Refill    B.infantis-B.ani-B.long-B.bifi (Probiotic 4X) 10-15 mg TbEC Take  by mouth. Taken every other day      acyclovir (ZOVIRAX) 5 % ointment Apply  to affected area every three (3) hours. (Patient taking differently: Apply  to affected area every three (3) hours. As needed) 2 g 5    OM3-DHA/EPA/D3/LUTEIN/ZEAZANTH (EYE OMEGA ADVANTAGE PO) Take 2,000 mg by mouth two (2) times a day.  PSYLLIUM HUSK/CALCIUM CARB (PSYLLIUM HUSK-CALCIUM PO) Take  by mouth.        No Known Allergies    Family History   Problem Relation Age of Onset    Heart Disease Mother     Cancer Mother         endometrial    Hypertension Mother     Hypertension Father     Cancer Sister         rectal    Breast Cancer Other 61        paternal cousin , triple neg     Social History     Tobacco Use    Smoking status: Never Smoker    Smokeless tobacco: Never Used   Substance Use Topics    Alcohol use: Yes         Dana Figueroa MD

## 2022-02-28 NOTE — PATIENT INSTRUCTIONS
Medicare Wellness Visit, Female     The best way to live healthy is to have a lifestyle where you eat a well-balanced diet, exercise regularly, limit alcohol use, and quit all forms of tobacco/nicotine, if applicable. Regular preventive services are another way to keep healthy. Preventive services (vaccines, screening tests, monitoring & exams) can help personalize your care plan, which helps you manage your own care. Screening tests can find health problems at the earliest stages, when they are easiest to treat. [facility] follows the current, evidence-based guidelines published by the [facility] Frey ([facility]) when recommending preventive services for our patients. Because we follow these guidelines, sometimes recommendations change over time as research supports it. (For example, mammograms used to be recommended annually. Even though Medicare will still pay for an annual mammogram, the newer guidelines recommend a mammogram every two years for women of average risk). Of course, you and your doctor may decide to screen more often for some diseases, based on your risk and your co-morbidities (chronic disease you are already diagnosed with). Preventive services for you include:  - Medicare offers their members a free annual wellness visit, which is time for you and your primary care provider to discuss and plan for your preventive service needs. Take advantage of this benefit every year!  -All adults over the age of 72 should receive the recommended pneumonia vaccines. Current USPSTF guidelines recommend a series of two vaccines for the best pneumonia protection.   -All adults should have a flu vaccine yearly and a tetanus vaccine every 10 years.   -All adults age 48 and older should receive the shingles vaccines (series of two vaccines).       -All adults age 38-68 who are overweight should have a diabetes screening test once every three years.   -All adults born between 80 and 1965 should be screened once for Hepatitis C.  -Other screening tests and preventive services for persons with diabetes include: an eye exam to screen for diabetic retinopathy, a kidney function test, a foot exam, and stricter control over your cholesterol.   -Cardiovascular screening for adults with routine risk involves an electrocardiogram (ECG) at intervals determined by your doctor.   -Colorectal cancer screenings should be done for adults age 54-65 with no increased risk factors for colorectal cancer. There are a number of acceptable methods of screening for this type of cancer. Each test has its own benefits and drawbacks. Discuss with your doctor what is most appropriate for you during your annual wellness visit. The different tests include: colonoscopy (considered the best screening method), a fecal occult blood test, a fecal DNA test, and sigmoidoscopy.    -A bone mass density test is recommended when a woman turns 65 to screen for osteoporosis. This test is only recommended one time, as a screening. Some providers will use this same test as a disease monitoring tool if you already have osteoporosis. -Breast cancer screenings are recommended every other year for women of normal risk, age 54-69.  -Cervical cancer screenings for women over age 72 are only recommended with certain risk factors.      Here is a list of your current Health Maintenance items (your personalized list of preventive services) with a due date:  Health Maintenance Due   Topic Date Due    Shingles Vaccine (2 of 2) 11/18/2020    Yearly Flu Vaccine (1) 09/01/2021    Depresssion Screening  11/18/2021    Annual Well Visit  11/19/2021

## 2022-03-23 ENCOUNTER — HOSPITAL ENCOUNTER (OUTPATIENT)
Dept: MAMMOGRAPHY | Age: 67
Discharge: HOME | End: 2022-03-23
Attending: INTERNAL MEDICINE
Payer: MEDICARE

## 2022-03-23 DIAGNOSIS — Z78.0 POST-MENOPAUSAL: ICD-10-CM

## 2022-03-23 PROCEDURE — 77080 DXA BONE DENSITY AXIAL: CPT

## 2022-03-24 DIAGNOSIS — E55.9 VITAMIN D DEFICIENCY: Primary | ICD-10-CM

## 2022-03-25 LAB — 25(OH)D3+25(OH)D2 SERPL-MCNC: 23.3 NG/ML (ref 30–100)

## 2022-04-25 ENCOUNTER — HOSPITAL ENCOUNTER (OUTPATIENT)
Dept: MAMMOGRAPHY | Age: 67
Discharge: HOME | End: 2022-04-25
Attending: INTERNAL MEDICINE
Payer: MEDICARE

## 2022-04-25 DIAGNOSIS — Z12.31 VISIT FOR SCREENING MAMMOGRAM: ICD-10-CM

## 2022-04-25 PROCEDURE — 77063 BREAST TOMOSYNTHESIS BI: CPT

## 2022-04-27 ENCOUNTER — TRANSCRIBE ORDER (OUTPATIENT)
Dept: SCHEDULING | Age: 67
End: 2022-04-27

## 2022-04-27 DIAGNOSIS — R92.8 ABNORMAL MAMMOGRAM: Primary | ICD-10-CM

## 2022-05-11 ENCOUNTER — HOSPITAL ENCOUNTER (OUTPATIENT)
Dept: MAMMOGRAPHY | Age: 67
Discharge: HOME | End: 2022-05-11
Attending: INTERNAL MEDICINE
Payer: MEDICARE

## 2022-05-11 DIAGNOSIS — R92.8 ABNORMAL MAMMOGRAM: ICD-10-CM

## 2022-05-11 PROCEDURE — 77065 DX MAMMO INCL CAD UNI: CPT

## 2022-05-23 ENCOUNTER — TRANSCRIBE ORDER (OUTPATIENT)
Dept: SCHEDULING | Age: 67
End: 2022-05-23

## 2022-05-23 DIAGNOSIS — Z12.31 SCREENING MAMMOGRAM FOR HIGH-RISK PATIENT: Primary | ICD-10-CM

## 2022-06-01 ENCOUNTER — OFFICE VISIT (OUTPATIENT)
Dept: INTERNAL MEDICINE CLINIC | Age: 67
End: 2022-06-01
Payer: MEDICARE

## 2022-06-01 VITALS
OXYGEN SATURATION: 98 % | HEART RATE: 76 BPM | BODY MASS INDEX: 22.71 KG/M2 | TEMPERATURE: 98.6 F | HEIGHT: 64 IN | WEIGHT: 133 LBS | RESPIRATION RATE: 16 BRPM | DIASTOLIC BLOOD PRESSURE: 67 MMHG | SYSTOLIC BLOOD PRESSURE: 149 MMHG

## 2022-06-01 DIAGNOSIS — R06.83 SNORING: ICD-10-CM

## 2022-06-01 DIAGNOSIS — J01.00 ACUTE NON-RECURRENT MAXILLARY SINUSITIS: Primary | ICD-10-CM

## 2022-06-01 PROCEDURE — 3017F COLORECTAL CA SCREEN DOC REV: CPT | Performed by: INTERNAL MEDICINE

## 2022-06-01 PROCEDURE — 99214 OFFICE O/P EST MOD 30 MIN: CPT | Performed by: INTERNAL MEDICINE

## 2022-06-01 PROCEDURE — 1101F PT FALLS ASSESS-DOCD LE1/YR: CPT | Performed by: INTERNAL MEDICINE

## 2022-06-01 PROCEDURE — G9899 SCRN MAM PERF RSLTS DOC: HCPCS | Performed by: INTERNAL MEDICINE

## 2022-06-01 PROCEDURE — 1090F PRES/ABSN URINE INCON ASSESS: CPT | Performed by: INTERNAL MEDICINE

## 2022-06-01 PROCEDURE — G8399 PT W/DXA RESULTS DOCUMENT: HCPCS | Performed by: INTERNAL MEDICINE

## 2022-06-01 PROCEDURE — G8420 CALC BMI NORM PARAMETERS: HCPCS | Performed by: INTERNAL MEDICINE

## 2022-06-01 PROCEDURE — G8510 SCR DEP NEG, NO PLAN REQD: HCPCS | Performed by: INTERNAL MEDICINE

## 2022-06-01 PROCEDURE — G8427 DOCREV CUR MEDS BY ELIG CLIN: HCPCS | Performed by: INTERNAL MEDICINE

## 2022-06-01 PROCEDURE — G8536 NO DOC ELDER MAL SCRN: HCPCS | Performed by: INTERNAL MEDICINE

## 2022-06-01 RX ORDER — AZITHROMYCIN 250 MG/1
250 TABLET, FILM COATED ORAL SEE ADMIN INSTRUCTIONS
Qty: 6 TABLET | Refills: 0 | Status: SHIPPED | OUTPATIENT
Start: 2022-06-01 | End: 2022-06-06

## 2022-06-01 RX ORDER — AZELASTINE HCL 205.5 UG/1
2 SPRAY NASAL 2 TIMES DAILY
Qty: 1 EACH | Refills: 2 | Status: SHIPPED | OUTPATIENT
Start: 2022-06-01 | End: 2022-07-26 | Stop reason: ALTCHOICE

## 2022-06-01 NOTE — PROGRESS NOTES
Yuridia Sims (: 1955) is a 75 y.o. female, established patient, here for evaluation of the following chief complaint(s):  Cough       ASSESSMENT/PLAN:  Below is the assessment and plan developed based on review of pertinent history, physical exam, labs, studies, and medications. 1. Acute non-recurrent maxillary sinusitis  -     azelastine (ASTEPRO) 205.5 mcg (0.15 %); 2 Sprays by Both Nostrils route two (2) times a day., Normal, Disp-1 Each, R-2  -     azithromycin (ZITHROMAX) 250 mg tablet; Take 1 Tablet by mouth See Admin Instructions for 5 days. , Normal, Disp-6 Tablet, R-0  PE normal, cough likely due to post-nasal drip. I recommended that she use Flonase in addition to Claritin or Zyrtec to manage any allergic component. Will rx Astepro to be used at night due to its sedating effect, as well as a z-pack to treat infection. 2. Snoring  Will order a sleep study test to explore fatigue and snoring. SUBJECTIVE/OBJECTIVE:  HPI    Cough: Pt presents today with c/o productive cough, which onset in April and improved, then returned two weeks ago. She denies facial pain, but doesn't generally experience that with sinus infections. Pt notes post-nasal drip, which \"tickles\" and causes a cough. She tried to manage her sx with allergy relief, decongestants, and cough medicine without relief. Sleep disturbance: Pt reports snoring and waking up not feeling well-rested, which has worsened as of late. Review of Systems   HENT: Positive for congestion and postnasal drip. Respiratory: Positive for cough. All other systems reviewed and are negative. Physical Exam  Constitutional:       Appearance: Normal appearance. HENT:      Right Ear: Tympanic membrane and external ear normal.      Left Ear: Tympanic membrane and external ear normal.      Mouth/Throat:      Mouth: Mucous membranes are moist.      Pharynx: Oropharynx is clear.    Cardiovascular:      Rate and Rhythm: Normal rate and regular rhythm. Pulses: Normal pulses. Heart sounds: Normal heart sounds. Pulmonary:      Effort: Pulmonary effort is normal.      Breath sounds: Normal breath sounds. Musculoskeletal:         General: Normal range of motion. Skin:     General: Skin is warm and dry. Neurological:      General: No focal deficit present. Mental Status: She is alert and oriented to person, place, and time. Psychiatric:         Mood and Affect: Mood normal.         Behavior: Behavior normal.       On this date 06/01/2022 I have spent 30 minutes reviewing previous notes, test results and face to face with the patient discussing the diagnosis and importance of compliance with the treatment plan as well as documenting on the day of the visit. An electronic signature was used to authenticate this note. Written by Guerline Diaz as dictated by Dr. James Tierney.    -- Guerline Diaz

## 2022-06-02 ENCOUNTER — TELEPHONE (OUTPATIENT)
Dept: INTERNAL MEDICINE CLINIC | Age: 67
End: 2022-06-02

## 2022-06-02 NOTE — TELEPHONE ENCOUNTER
----- Message from Rhina Dow sent at 6/2/2022  1:19 PM EDT -----  Subject: Message to Provider    QUESTIONS  Information for Provider? Dr. Alejandra Jean from [facility] called regarding an order placed for a sleep study for the pt. Lis is needing office visit notes for insurance purposes and practice is   not answering; may be out at lunch. Please fax these orders to   [phone] Chelle Rain). call Lis @ [facility] if any   questions - phone [phone] ext [phone]  ---------------------------------------------------------------------------  --------------  CALL BACK INFO  What is the best way for the office to contact you? OK to leave message on   voicemail  Preferred Call Back Phone Number? [phone]  ---------------------------------------------------------------------------  --------------  SCRIPT ANSWERS  Relationship to Patient? Third Party  Third Party Type? Other  Other Third Party Type? [facility]  Representative Name?  Lis

## 2022-07-25 NOTE — PROGRESS NOTES
Yuridia Sims (: 1955) is a 75 y.o. female, established patient, here for evaluation of the following chief complaint(s):  Elbow Swelling (L elbow. Better today )       ASSESSMENT/PLAN:  Below is the assessment and plan developed based on review of pertinent history, physical exam, labs, studies, and medications. 1. Olecranon bursitis of left elbow  Reassured- area is less red- some swelling has shifted but no signs of nidus of infection, no fever or chills, told to cont with antiinflammtories for rest of week and if not better to message me  No follow-ups on file. SUBJECTIVE/OBJECTIVE:  HPI  Pt has a swollen elbow on - sitting in [place] and felt it was warm and sore but better after taking motrin     Review of Systems   All other systems reviewed and are negative. Physical Exam  Vitals and nursing note reviewed. Constitutional:       Appearance: She is well-developed. HENT:      Head: Normocephalic and atraumatic. Right Ear: External ear normal.      Left Ear: External ear normal.      Nose: Nose normal.   Neck:      Thyroid: No thyroid mass or thyromegaly. Vascular: No carotid bruit or JVD. Cardiovascular:      Rate and Rhythm: Normal rate and regular rhythm. Pulses: Normal pulses. Heart sounds: Normal heart sounds, S1 normal and S2 normal. No murmur heard. No friction rub. No gallop. Pulmonary:      Effort: Pulmonary effort is normal.      Breath sounds: Normal breath sounds. Abdominal:      General: Bowel sounds are normal.      Palpations: Abdomen is soft. Musculoskeletal:         General: Normal range of motion. Cervical back: Normal range of motion and neck supple. Comments: Bursitis elbow   Skin:     General: Skin is warm and dry. Neurological:      Mental Status: She is alert and oriented to person, place, and time. Psychiatric:         Behavior: Behavior normal.         Thought Content:  Thought content normal.         Judgment: Judgment normal.         On this date 07/26/2022 I have spent 25 minutes reviewing previous notes, test results and face to face with the patient discussing the diagnosis and importance of compliance with the treatment plan as well as documenting on the day of the visit. An electronic signature was used to authenticate this note.   -- Kennis Mcardle, MD

## 2022-07-26 ENCOUNTER — OFFICE VISIT (OUTPATIENT)
Dept: INTERNAL MEDICINE CLINIC | Age: 67
End: 2022-07-26
Payer: MEDICARE

## 2022-07-26 VITALS
RESPIRATION RATE: 16 BRPM | SYSTOLIC BLOOD PRESSURE: 105 MMHG | BODY MASS INDEX: 23.12 KG/M2 | OXYGEN SATURATION: 98 % | TEMPERATURE: 98.6 F | HEIGHT: 64 IN | DIASTOLIC BLOOD PRESSURE: 66 MMHG | WEIGHT: 135.4 LBS | HEART RATE: 66 BPM

## 2022-07-26 DIAGNOSIS — M70.22 OLECRANON BURSITIS OF LEFT ELBOW: Primary | ICD-10-CM

## 2022-07-26 PROCEDURE — 3017F COLORECTAL CA SCREEN DOC REV: CPT | Performed by: INTERNAL MEDICINE

## 2022-07-26 PROCEDURE — 99213 OFFICE O/P EST LOW 20 MIN: CPT | Performed by: INTERNAL MEDICINE

## 2022-07-26 PROCEDURE — G8399 PT W/DXA RESULTS DOCUMENT: HCPCS | Performed by: INTERNAL MEDICINE

## 2022-07-26 PROCEDURE — G8536 NO DOC ELDER MAL SCRN: HCPCS | Performed by: INTERNAL MEDICINE

## 2022-07-26 PROCEDURE — 1101F PT FALLS ASSESS-DOCD LE1/YR: CPT | Performed by: INTERNAL MEDICINE

## 2022-07-26 PROCEDURE — G8420 CALC BMI NORM PARAMETERS: HCPCS | Performed by: INTERNAL MEDICINE

## 2022-07-26 PROCEDURE — 1090F PRES/ABSN URINE INCON ASSESS: CPT | Performed by: INTERNAL MEDICINE

## 2022-07-26 PROCEDURE — G8427 DOCREV CUR MEDS BY ELIG CLIN: HCPCS | Performed by: INTERNAL MEDICINE

## 2022-07-26 PROCEDURE — G9899 SCRN MAM PERF RSLTS DOC: HCPCS | Performed by: INTERNAL MEDICINE

## 2022-07-26 PROCEDURE — G8510 SCR DEP NEG, NO PLAN REQD: HCPCS | Performed by: INTERNAL MEDICINE

## 2022-08-10 RX ORDER — ERGOCALCIFEROL 1.25 MG/1
CAPSULE ORAL
Qty: 5 CAPSULE | Refills: 3 | Status: SHIPPED | OUTPATIENT
Start: 2022-08-10

## 2023-04-21 DIAGNOSIS — Z12.31 SCREENING MAMMOGRAM FOR HIGH-RISK PATIENT: Primary | ICD-10-CM

## 2023-04-24 ENCOUNTER — PATIENT MESSAGE (OUTPATIENT)
Dept: OBGYN CLINIC | Age: 68
End: 2023-04-24

## 2023-04-27 ENCOUNTER — HOSPITAL ENCOUNTER (OUTPATIENT)
Dept: MAMMOGRAPHY | Age: 68
Discharge: HOME | End: 2023-04-27
Attending: INTERNAL MEDICINE
Payer: MEDICARE

## 2023-04-27 DIAGNOSIS — Z12.31 SCREENING MAMMOGRAM FOR HIGH-RISK PATIENT: ICD-10-CM

## 2023-04-27 PROCEDURE — 77063 BREAST TOMOSYNTHESIS BI: CPT

## 2023-04-28 ENCOUNTER — OFFICE VISIT (OUTPATIENT)
Dept: OBGYN CLINIC | Age: 68
End: 2023-04-28
Payer: MEDICARE

## 2023-04-28 VITALS
HEART RATE: 79 BPM | HEIGHT: 64 IN | WEIGHT: 139.2 LBS | SYSTOLIC BLOOD PRESSURE: 128 MMHG | BODY MASS INDEX: 23.76 KG/M2 | DIASTOLIC BLOOD PRESSURE: 74 MMHG

## 2023-04-28 DIAGNOSIS — Z12.4 CERVICAL CANCER SCREENING: ICD-10-CM

## 2023-04-28 DIAGNOSIS — Z01.419 ENCOUNTER FOR WELL WOMAN EXAM WITH ROUTINE GYNECOLOGICAL EXAM: Primary | ICD-10-CM

## 2023-04-28 PROCEDURE — G8420 CALC BMI NORM PARAMETERS: HCPCS | Performed by: OBSTETRICS & GYNECOLOGY

## 2023-04-28 PROCEDURE — 3017F COLORECTAL CA SCREEN DOC REV: CPT | Performed by: OBSTETRICS & GYNECOLOGY

## 2023-04-28 PROCEDURE — 99397 PER PM REEVAL EST PAT 65+ YR: CPT | Performed by: OBSTETRICS & GYNECOLOGY

## 2023-04-28 PROCEDURE — G8432 DEP SCR NOT DOC, RNG: HCPCS | Performed by: OBSTETRICS & GYNECOLOGY

## 2023-04-28 PROCEDURE — 1090F PRES/ABSN URINE INCON ASSESS: CPT | Performed by: OBSTETRICS & GYNECOLOGY

## 2023-04-28 PROCEDURE — G9899 SCRN MAM PERF RSLTS DOC: HCPCS | Performed by: OBSTETRICS & GYNECOLOGY

## 2023-04-28 PROCEDURE — 1101F PT FALLS ASSESS-DOCD LE1/YR: CPT | Performed by: OBSTETRICS & GYNECOLOGY

## 2023-04-28 NOTE — PROGRESS NOTES
Ellie Chacon is a 70 y.o. female returns for an annual exam     Chief Complaint   Patient presents with    Well Woman       No LMP recorded. Patient is postmenopausal.  Her periods are none  She does not have dysmenorrhea. Problems: pt reports some urinary incontinence. Pt has a anterior vaginal wall prolapse, pt reports she did pelvic PT a few years ago but only helped some. Birth Control: post menopausal status. Last Pap: normal obtained 8/2016  She does not have a history of TANNA 2, 3 or cervical cancer. Last Mammogram:  4/27/23 at [place] .   It was normal   Last Bone Density: 3/2022 osteopenic   Last colonoscopy: 11/2019 polyps removed        Examination chaperoned by Bc Andrade MA.

## 2023-04-28 NOTE — PROGRESS NOTES
[address] [facility]  [email or web address]  [phone]    Huey Damico MD, [address] [facility]     Annual Gynecologic Exam:  Chief Complaint   Patient presents with    Well Woman       Stephanie Bradford is a ,  70 y.o. female   No LMP recorded. Patient is postmenopausal.    She presents for her annual checkup. She is having significant prolpase and pressure all day long, mild UI first thing in am, does not void at night. PT had minimal benefit in past .      Per Rooming Note:  Her periods are none  She does not have dysmenorrhea. Problems: pt reports some urinary incontinence. Pt has a anterior vaginal wall prolapse, pt reports she did pelvic PT a few years ago but only helped some. Birth Control: post menopausal status. Last Pap: normal obtained 2016  She does not have a history of TANNA 2, 3 or cervical cancer. Last Mammogram:  23 at [place] . It was normal   Last Bone Density: 3/2022 osteopenic   Last colonoscopy: 2019 polyps removed    Sexual history and Contraception:  Social History     Substance and Sexual Activity   Sexual Activity Yes    Partners: Male    Birth control/protection: None       Past Medical History:   Diagnosis Date    H/O mammogram 11/15/2017    Negative    Hx of mammogram 2014;2016    normal; negative    Pap smear for cervical cancer screening 2010    negative, HPV negative    Pap smear for cervical cancer screening 2016    negative, HPV negative     Current Outpatient Medications   Medication Sig    bran/gum/fib/drew/psyl/kelp/pec (FIBER 6 PO) Take  by mouth. B.infantis-B.ani-B.long-B.bifi (Probiotic 4X) 10-15 mg TbEC Take  by mouth. Taken every other day    OM3-DHA/EPA/D3/LUTEIN/ZEAZANTH (EYE OMEGA ADVANTAGE PO) Take 2,000 mg by mouth two (2) times a day. docosahexanoic acid-eicosapent 120-180 mg cap Take  by mouth daily.  (Patient not taking: Reported on 2023)    ergocalciferol (ERGOCALCIFEROL) 1,250 mcg (50,000 unit) capsule TAKE 1 CAPSULE BY MOUTH EVERY 7 DAYS    acyclovir (ZOVIRAX) 5 % ointment Apply  to affected area every three (3) hours. (Patient not taking: Reported on 2022)    PSYLLIUM HUSK/CALCIUM CARB (PSYLLIUM HUSK-CALCIUM PO) Take  by mouth. (Patient not taking: Reported on 2023)     No current facility-administered medications for this visit. OB History    Para Term  AB Living   3 3 3         SAB IAB Ectopic Molar Multiple Live Births             3      # Outcome Date GA Lbr Luis/2nd Weight Sex Delivery Anes PTL Lv   3 Term            2 Term            1 Term              Past Surgical History:   Procedure Laterality Date    ENDOSCOPY, COLON, DIAGNOSTIC       normal    HX ORTHOPAEDIC      foot surgeries     Family History   Problem Relation Age of Onset    Heart Disease Mother     Cancer Mother         endometrial    Hypertension Mother     Hypertension Father     Cancer Sister         rectal    Breast Cancer Other 61        paternal cousin , triple neg     Social History     Socioeconomic History    Marital status:      Spouse name: Not on file    Number of children: Not on file    Years of education: Not on file    Highest education level: Not on file   Occupational History    Not on file   Tobacco Use    Smoking status: Never    Smokeless tobacco: Never   Vaping Use    Vaping Use: Never used   Substance and Sexual Activity    Alcohol use:  Yes     Alcohol/week: 1.0 standard drink     Types: 1 Cans of beer per week    Drug use: No    Sexual activity: Yes     Partners: Male     Birth control/protection: None   Other Topics Concern    Not on file   Social History Narrative    Not on file     Social Determinants of Health     Financial Resource Strain: Not on file   Food Insecurity: Not on file   Transportation Needs: Not on file   Physical Activity: Not on file   Stress: Not on file   Social Connections: Not on file   Intimate Partner Violence: Not on file   Housing Stability: Not on file     Tobacco History:  reports that she has never smoked. She has never used smokeless tobacco.  Alcohol Abuse:  reports current alcohol use of about 1.0 standard drink per week. Drug Abuse:  reports no history of drug use. No Known Allergies    Patient Active Problem List   Diagnosis Code    Urinary incontinence R32    Prolapse of anterior vaginal wall N81.10       Review of Systems - History obtained from the patient and patient filled out questionnaire   Constitutional/general, HEENT, CV, Resp, GI, MSK, Neuro, Psych, Heme/lymph, Skin, Breast ROS: no significant complaints except as noted on HPI    Physical Exam  Visit Vitals  /74   Pulse 79   Ht 5' 4\" (1.626 m)   Wt 139 lb 3.2 oz (63.1 kg)   BMI 23.89 kg/m²       Constitutional  Appearance: well-nourished, well developed, alert, in no acute distress    HENT  Head and Face: appears normal    Neck  Inspection/Palpation: normal appearance, no masses or tenderness  Lymph Nodes: no lymphadenopathy present  Thyroid: gland size normal, nontender, no nodules or masses present on palpation    Chest  Respiratory Effort: breathing unlabored  Auscultation: normal breath sounds    Cardiovascular  Heart:   Auscultation: regular rate and rhythm without murmur    Breasts  Inspection of Breasts: breasts symmetrical, no skin changes, no discharge present, nipple appearance normal, no skin retraction present  Palpation of Breasts and Axillae: no masses present on palpation, no breast tenderness  Axillary Lymph Nodes: no lymphadenopathy present    Gastrointestinal  Abdominal Examination: abdomen non-tender to palpation, normal bowel sounds, no masses present  Liver and spleen: no hepatomegaly present, spleen not palpable  Hernias: no hernias identified    Genitourinary  External Genitalia: normal appearance for age, no discharge present, no tenderness present, no inflammatory lesions present, no masses present  Vagina: normal vaginal vault prolapse ant>> post to 0 minimal discharge present, no inflammatory lesions present, no masses present  Bladder: non-tender to palpation  Urethra: appears normal  Cervix: normal   Uterus: normal size, shape and consistency  Adnexa: no adnexal tenderness present, no adnexal masses present  Perineum: perineum within normal limits, no evidence of trauma, no rashes or skin lesions present  Anus: anus within normal limits, no hemorrhoids present  Inguinal Lymph Nodes: no lymphadenopathy present    Skin  General Inspection: no rash, no lesions identified    Neurologic/Psychiatric  Mental Status:  Orientation: grossly oriented to person, place and time  Mood and Affect: mood normal, affect appropriate    Assessment:  70 y.o.  for well woman exam  Her current medical status is satisfactory with no evidence of significant gynecologic issues. Encounter Diagnoses   Name Primary? Cervical cancer screening     Encounter for well woman exam with routine gynecological exam Yes       Plan:  I recommended follow up one year for routine annual gynecologic exam or sooner prn  Patient should follow up with a primary care physician for chronic medical problems and evaluation of non-gynecologic concerns and to please contact our office with any GYN questions or concerns. I recommend STD testing per CDC guidelines and at patient request.   We discussed treatment options for prolapse vs observation. The patient will start with home pelvic floor exercises: handout given  We discussed option of incorporating physical therapy and other options including but not limited to surgery, pessary fitting, over the counter inserts and other alternatives. I recommended avoiding strain to the pelvic floor, bowel regimen if needed and using proper lifting techniques. Since obesity and tobacco use can also be risk factors for prolapse, I advised avoiding/reversing these risk factors to help with management options.   Additional ACOG information: [email or web address]  Plan pessary fitting  Declines repeat PT  Pessary ho  Pelvic floor exercise ho given          Folllow up:  [x] return for annual well woman exam in one year or sooner if she is having problems  [] follow up and ultrasound  [] 6 months  [] 3 months  [] 6 weeks   [] 1 month    Orders Placed This Encounter    PAP IG, RFX APTIMA HPV ASCUS ([id number])       No results found for any visits on 04/28/23.

## 2023-04-30 RX ORDER — ACYCLOVIR 50 MG/G
OINTMENT TOPICAL
COMMUNITY
Start: 2019-05-14

## 2023-04-30 RX ORDER — ERGOCALCIFEROL 1.25 MG/1
CAPSULE ORAL
COMMUNITY
Start: 2022-08-10

## 2023-05-09 LAB
CYTOLOGIST CVX/VAG CYTO: NORMAL
CYTOLOGY CVX/VAG DOC CYTO: NORMAL
CYTOLOGY CVX/VAG DOC THIN PREP: NORMAL
CYTOLOGY HISTORY:: NORMAL
DX ICD CODE: NORMAL
LABCORP, 190119: NORMAL
Lab: NORMAL
OTHER STN SPEC: NORMAL
STAT OF ADQ CVX/VAG CYTO-IMP: NORMAL

## 2023-05-19 ENCOUNTER — PROCEDURE VISIT (OUTPATIENT)
Age: 68
End: 2023-05-19

## 2023-05-19 VITALS — SYSTOLIC BLOOD PRESSURE: 107 MMHG | BODY MASS INDEX: 23.86 KG/M2 | DIASTOLIC BLOOD PRESSURE: 68 MMHG | WEIGHT: 139 LBS

## 2023-05-19 DIAGNOSIS — Z46.89 ENCOUNTER FOR FITTING AND ADJUSTMENT OF PESSARY: ICD-10-CM

## 2023-05-19 DIAGNOSIS — N81.10 VAGINAL PROLAPSE: Primary | ICD-10-CM

## 2023-05-19 RX ORDER — CHLORAL HYDRATE 500 MG
CAPSULE ORAL DAILY
COMMUNITY

## 2023-05-19 NOTE — PROGRESS NOTES
[address] [facility]  [email or web address]  [email or web address]  [phone]    Norma Glass MD, FACOG       OB/GYN   Procedure Note        Ariella Torres   5/19/2023               Subjective:   Reynaldo Torres is a 70 y.o. female  No obstetric history on file. here for pessary fitting. She has had progressively worsening pelvic organ prolapse over the past few months. .        Past Medical History:   Diagnosis Date    H/O mammogram 11/15/2017    Negative    Hx of mammogram 8/20/2014;08/05/2016    normal; negative    Pap smear for cervical cancer screening 07/28/2010    negative, HPV negative    Pap smear for cervical cancer screening 08/04/2016    negative, HPV negative       Past Surgical History:   Procedure Laterality Date    COLONOSCOPY      2011 normal    ORTHOPEDIC SURGERY      foot surgeries       Social History     Socioeconomic History    Marital status:      Spouse name: Not on file    Number of children: Not on file    Years of education: Not on file    Highest education level: Not on file   Occupational History    Not on file   Tobacco Use    Smoking status: Never    Smokeless tobacco: Never   Substance and Sexual Activity    Alcohol use:  Yes     Alcohol/week: 1.0 standard drink    Drug use: No    Sexual activity: Not on file   Other Topics Concern    Not on file   Social History Narrative    Not on file     Social Determinants of Health     Financial Resource Strain: Not on file   Food Insecurity: Not on file   Transportation Needs: Not on file   Physical Activity: Not on file   Stress: Not on file   Social Connections: Not on file   Intimate Partner Violence: Not At Risk    Fear of Current or Ex-Partner: No    Emotionally Abused: No    Physically Abused: No    Sexually Abused: No   Housing Stability: Not on file       Current Outpatient Medications   Medication Sig Dispense Refill    Calcium Polycarbophil (FIBER-CAPS PO)

## 2023-06-02 ENCOUNTER — TELEPHONE (OUTPATIENT)
Age: 68
End: 2023-06-02

## 2023-06-02 NOTE — TELEPHONE ENCOUNTER
Called pt to inform her that her pessary had arrived to our office. Gave pt 2 options per Dr. Ap Emmanuel. 1. Can come get pessary from  & take it home to insert it herself & fu with us in 1-2 months OR 2. Schedule appt with Dr. Ap Emmanuel for insertion    Pt likes option 1.  Pt is out of town & plans to  the pessary from our office next tuesday/Wednesday

## 2023-09-13 ENCOUNTER — TRANSCRIBE ORDERS (OUTPATIENT)
Facility: HOSPITAL | Age: 68
End: 2023-09-13

## 2023-09-13 DIAGNOSIS — Z12.31 SCREENING MAMMOGRAM FOR HIGH-RISK PATIENT: Primary | ICD-10-CM

## 2023-10-04 NOTE — PROGRESS NOTES
Ariella Torres (:  1955) is a 70 y.o. female,Established patient, here for evaluation of the following chief complaint(s):  Medicare AWV (Pt here for an annual exam,  pt is not fasting, GYN exam 2023. No other concerns. )         ASSESSMENT/PLAN:  1. Medicare annual wellness visit, subsequent  -     CBC; Future  -     TSH; Future  2. Foot pain, bilateral  -     [facility], [address], [place], Orthopedic Surgery (foot, ankle), [place]  3. Lipid disorder  -     Lipid Panel; Future  -     Comprehensive Metabolic Panel; Future  4. Vitamin D deficiency  -     Vitamin D 25 Hydroxy; Future  5. Needs flu shot  -     Influenza, FLUAD, (age 72 y+), IM, Preservative Free, 0.5 mL      Return for Medicare Annual Wellness Visit in 1 year. Your granddaughter is turning 11 - luis is in [place]- face time   6 grandkids [place]  14,12,10,9,8   [place] son   Subjective   SUBJECTIVE/OBJECTIVE:  HPI    Here for a physical     She does water aerobic , weights and walking     Her right finger is swollen and clicking and  his feet have issues with her toes and she has a bunion that needs to be taken care     She does have tiny hernia and slight bulge and doesn't hurt her and does have pessary and prolapse as doesn't want to do surgery    Review of Systems   All other systems reviewed and are negative. Objective   Physical Exam  Vitals and nursing note reviewed. Constitutional:       Appearance: Normal appearance. HENT:      Head: Normocephalic and atraumatic. Right Ear: Tympanic membrane and external ear normal.      Left Ear: Tympanic membrane and external ear normal.      Nose: Nose normal.      Mouth/Throat:      Mouth: Mucous membranes are moist.   Eyes:      Extraocular Movements: Extraocular movements intact. Conjunctiva/sclera: Conjunctivae normal.   Cardiovascular:      Rate and Rhythm: Normal rate and regular rhythm.    Pulmonary:      Effort: Pulmonary effort is normal.      Breath

## 2023-10-05 ENCOUNTER — OFFICE VISIT (OUTPATIENT)
Age: 68
End: 2023-10-05
Payer: MEDICARE

## 2023-10-05 VITALS
HEART RATE: 69 BPM | RESPIRATION RATE: 16 BRPM | DIASTOLIC BLOOD PRESSURE: 71 MMHG | HEIGHT: 64 IN | SYSTOLIC BLOOD PRESSURE: 117 MMHG | WEIGHT: 135 LBS | OXYGEN SATURATION: 96 % | BODY MASS INDEX: 23.05 KG/M2 | TEMPERATURE: 98.1 F

## 2023-10-05 DIAGNOSIS — Z23 NEEDS FLU SHOT: ICD-10-CM

## 2023-10-05 DIAGNOSIS — M79.671 FOOT PAIN, BILATERAL: ICD-10-CM

## 2023-10-05 DIAGNOSIS — M79.672 FOOT PAIN, BILATERAL: ICD-10-CM

## 2023-10-05 DIAGNOSIS — Z00.00 MEDICARE ANNUAL WELLNESS VISIT, SUBSEQUENT: Primary | ICD-10-CM

## 2023-10-05 DIAGNOSIS — E78.9 LIPID DISORDER: ICD-10-CM

## 2023-10-05 DIAGNOSIS — E55.9 VITAMIN D DEFICIENCY: ICD-10-CM

## 2023-10-05 PROCEDURE — G0439 PPPS, SUBSEQ VISIT: HCPCS | Performed by: INTERNAL MEDICINE

## 2023-10-05 PROCEDURE — G0008 ADMIN INFLUENZA VIRUS VAC: HCPCS | Performed by: INTERNAL MEDICINE

## 2023-10-05 PROCEDURE — 1123F ACP DISCUSS/DSCN MKR DOCD: CPT | Performed by: INTERNAL MEDICINE

## 2023-10-05 PROCEDURE — PBSHW INFLUENZA, FLUAD, (AGE 65 Y+), IM, PF, 0.5 ML: Performed by: INTERNAL MEDICINE

## 2023-10-05 SDOH — ECONOMIC STABILITY: HOUSING INSECURITY
IN THE LAST 12 MONTHS, WAS THERE A TIME WHEN YOU DID NOT HAVE A STEADY PLACE TO SLEEP OR SLEPT IN A SHELTER (INCLUDING NOW)?: NO

## 2023-10-05 SDOH — ECONOMIC STABILITY: FOOD INSECURITY: WITHIN THE PAST 12 MONTHS, THE FOOD YOU BOUGHT JUST DIDN'T LAST AND YOU DIDN'T HAVE MONEY TO GET MORE.: NEVER TRUE

## 2023-10-05 SDOH — HEALTH STABILITY: PHYSICAL HEALTH: ON AVERAGE, HOW MANY DAYS PER WEEK DO YOU ENGAGE IN MODERATE TO STRENUOUS EXERCISE (LIKE A BRISK WALK)?: 4 DAYS

## 2023-10-05 SDOH — ECONOMIC STABILITY: INCOME INSECURITY: HOW HARD IS IT FOR YOU TO PAY FOR THE VERY BASICS LIKE FOOD, HOUSING, MEDICAL CARE, AND HEATING?: NOT HARD AT ALL

## 2023-10-05 SDOH — HEALTH STABILITY: PHYSICAL HEALTH: ON AVERAGE, HOW MANY MINUTES DO YOU ENGAGE IN EXERCISE AT THIS LEVEL?: 50 MIN

## 2023-10-05 SDOH — ECONOMIC STABILITY: FOOD INSECURITY: WITHIN THE PAST 12 MONTHS, YOU WORRIED THAT YOUR FOOD WOULD RUN OUT BEFORE YOU GOT MONEY TO BUY MORE.: NEVER TRUE

## 2023-10-05 ASSESSMENT — LIFESTYLE VARIABLES
HOW OFTEN DO YOU HAVE A DRINK CONTAINING ALCOHOL: 3
HOW MANY STANDARD DRINKS CONTAINING ALCOHOL DO YOU HAVE ON A TYPICAL DAY: 1 OR 2
HOW OFTEN DO YOU HAVE A DRINK CONTAINING ALCOHOL: 2-4 TIMES A MONTH
HOW MANY STANDARD DRINKS CONTAINING ALCOHOL DO YOU HAVE ON A TYPICAL DAY: 1
HOW OFTEN DO YOU HAVE SIX OR MORE DRINKS ON ONE OCCASION: 1

## 2023-10-05 ASSESSMENT — PATIENT HEALTH QUESTIONNAIRE - PHQ9
1. LITTLE INTEREST OR PLEASURE IN DOING THINGS: 0
SUM OF ALL RESPONSES TO PHQ QUESTIONS 1-9: 0
SUM OF ALL RESPONSES TO PHQ QUESTIONS 1-9: 0
SUM OF ALL RESPONSES TO PHQ9 QUESTIONS 1 & 2: 0
SUM OF ALL RESPONSES TO PHQ QUESTIONS 1-9: 0
SUM OF ALL RESPONSES TO PHQ QUESTIONS 1-9: 0
2. FEELING DOWN, DEPRESSED OR HOPELESS: 0

## 2023-10-05 NOTE — PATIENT INSTRUCTIONS
Advance Directives: Care Instructions  Overview  An advance directive is a legal way to state your wishes at the end of your life. It tells your family and your doctor what to do if you can't say what you want. There are two main types of advance directives. You can change them any time your wishes change. Living will. This form tells your family and your doctor your wishes about life support and other treatment. The form is also called a declaration. Medical power of . This form lets you name a person to make treatment decisions for you when you can't speak for yourself. This person is called a health care agent (health care proxy, health care surrogate). The form is also called a durable power of  for health care. If you do not have an advance directive, decisions about your medical care may be made by a family member, or by a doctor or a  who doesn't know you. It may help to think of an advance directive as a gift to the people who care for you. If you have one, they won't have to make tough decisions by themselves. For more information, including forms for your state, see the [address] [place] [address] website (www.caringinfo.org/planning/advance-directives/). Follow-up care is a key part of your treatment and safety. Be sure to make and go to all appointments, and call your doctor if you are having problems. It's also a good idea to know your test results and keep a list of the medicines you take. What should you include in an advance directive? Many states have a unique advance directive form. (It may ask you to address specific issues.) Or you might use a universal form that's approved by many states. If your form doesn't tell you what to address, it may be hard to know what to include in your advance directive. Use the questions below to help you get started. Who do you want to make decisions about your medical care if you are not able to?   What life-support measures do you want if you

## 2023-10-06 DIAGNOSIS — E78.9 LIPID DISORDER: ICD-10-CM

## 2023-10-06 DIAGNOSIS — E55.9 VITAMIN D DEFICIENCY: ICD-10-CM

## 2023-10-06 DIAGNOSIS — Z00.00 MEDICARE ANNUAL WELLNESS VISIT, SUBSEQUENT: ICD-10-CM

## 2023-10-06 LAB
25(OH)D3 SERPL-MCNC: 34.2 NG/ML (ref 30–100)
ALBUMIN SERPL-MCNC: 3.8 G/DL (ref 3.5–5)
ALBUMIN/GLOB SERPL: 1.2 (ref 1.1–2.2)
ALP SERPL-CCNC: 71 U/L (ref 45–117)
ALT SERPL-CCNC: 21 U/L (ref 12–78)
ANION GAP SERPL CALC-SCNC: 6 MMOL/L (ref 5–15)
AST SERPL-CCNC: 21 U/L (ref 15–37)
BILIRUB SERPL-MCNC: 0.7 MG/DL (ref 0.2–1)
BUN SERPL-MCNC: 15 MG/DL (ref 6–20)
BUN/CREAT SERPL: 16 (ref 12–20)
CALCIUM SERPL-MCNC: 9.3 MG/DL (ref 8.5–10.1)
CHLORIDE SERPL-SCNC: 105 MMOL/L (ref 97–108)
CHOLEST SERPL-MCNC: 216 MG/DL
CO2 SERPL-SCNC: 25 MMOL/L (ref 21–32)
CREAT SERPL-MCNC: 0.92 MG/DL (ref 0.55–1.02)
ERYTHROCYTE [DISTWIDTH] IN BLOOD BY AUTOMATED COUNT: 12.6 % (ref 11.5–14.5)
GLOBULIN SER CALC-MCNC: 3.1 G/DL (ref 2–4)
GLUCOSE SERPL-MCNC: 97 MG/DL (ref 65–100)
HCT VFR BLD AUTO: 41.2 % (ref 35–47)
HDLC SERPL-MCNC: 101 MG/DL
HDLC SERPL: 2.1 (ref 0–5)
HGB BLD-MCNC: 13.6 G/DL (ref 11.5–16)
LDLC SERPL CALC-MCNC: 107.2 MG/DL (ref 0–100)
MCH RBC QN AUTO: 29.3 PG (ref 26–34)
MCHC RBC AUTO-ENTMCNC: 33 G/DL (ref 30–36.5)
MCV RBC AUTO: 88.8 FL (ref 80–99)
NRBC # BLD: 0 K/UL (ref 0–0.01)
NRBC BLD-RTO: 0 PER 100 WBC
PLATELET # BLD AUTO: 183 K/UL (ref 150–400)
PMV BLD AUTO: 10.8 FL (ref 8.9–12.9)
POTASSIUM SERPL-SCNC: 4.8 MMOL/L (ref 3.5–5.1)
PROT SERPL-MCNC: 6.9 G/DL (ref 6.4–8.2)
RBC # BLD AUTO: 4.64 M/UL (ref 3.8–5.2)
SODIUM SERPL-SCNC: 136 MMOL/L (ref 136–145)
TRIGL SERPL-MCNC: 39 MG/DL
TSH SERPL DL<=0.05 MIU/L-ACNC: 2.31 UIU/ML (ref 0.36–3.74)
VLDLC SERPL CALC-MCNC: 7.8 MG/DL
WBC # BLD AUTO: 5.4 K/UL (ref 3.6–11)

## 2024-03-21 NOTE — PROGRESS NOTES
oriented to person, place, and time. Mental status is at baseline.   Psychiatric:         Mood and Affect: Mood normal.         Behavior: Behavior normal.         Thought Content: Thought content normal.         Judgment: Judgment normal.            On this date 3/22/2024 I have spent 25 minutes reviewing previous notes, test results and face to face with the patient discussing the diagnosis and importance of compliance with the treatment plan as well as documenting on the day of the visit.      An electronic signature was used to authenticate this note.    --Cinthia Taveras MD

## 2024-03-22 ENCOUNTER — OFFICE VISIT (OUTPATIENT)
Age: 69
End: 2024-03-22
Payer: MEDICARE

## 2024-03-22 VITALS
HEIGHT: 64 IN | DIASTOLIC BLOOD PRESSURE: 76 MMHG | HEART RATE: 70 BPM | TEMPERATURE: 98.6 F | WEIGHT: 138 LBS | RESPIRATION RATE: 14 BRPM | OXYGEN SATURATION: 97 % | SYSTOLIC BLOOD PRESSURE: 126 MMHG | BODY MASS INDEX: 23.56 KG/M2

## 2024-03-22 DIAGNOSIS — Z01.818 PREOP EXAMINATION: Primary | ICD-10-CM

## 2024-03-22 DIAGNOSIS — E78.5 HYPERLIPIDEMIA LDL GOAL <100: ICD-10-CM

## 2024-03-22 DIAGNOSIS — Z01.818 PREOP EXAMINATION: ICD-10-CM

## 2024-03-22 DIAGNOSIS — T14.8XXA BRUISE: ICD-10-CM

## 2024-03-22 LAB
ANION GAP SERPL CALC-SCNC: 4 MMOL/L (ref 5–15)
BUN SERPL-MCNC: 12 MG/DL (ref 6–20)
BUN/CREAT SERPL: 14 (ref 12–20)
CALCIUM SERPL-MCNC: 9.2 MG/DL (ref 8.5–10.1)
CHLORIDE SERPL-SCNC: 107 MMOL/L (ref 97–108)
CHOLEST SERPL-MCNC: 217 MG/DL
CO2 SERPL-SCNC: 27 MMOL/L (ref 21–32)
CREAT SERPL-MCNC: 0.87 MG/DL (ref 0.55–1.02)
ERYTHROCYTE [DISTWIDTH] IN BLOOD BY AUTOMATED COUNT: 12.6 % (ref 11.5–14.5)
GLUCOSE SERPL-MCNC: 95 MG/DL (ref 65–100)
HCT VFR BLD AUTO: 41.5 % (ref 35–47)
HDLC SERPL-MCNC: 103 MG/DL
HDLC SERPL: 2.1 (ref 0–5)
HGB BLD-MCNC: 13.5 G/DL (ref 11.5–16)
INR PPP: 1 (ref 0.9–1.1)
LDLC SERPL CALC-MCNC: 103.2 MG/DL (ref 0–100)
MCH RBC QN AUTO: 29.4 PG (ref 26–34)
MCHC RBC AUTO-ENTMCNC: 32.5 G/DL (ref 30–36.5)
MCV RBC AUTO: 90.4 FL (ref 80–99)
NRBC # BLD: 0 K/UL (ref 0–0.01)
NRBC BLD-RTO: 0 PER 100 WBC
PLATELET # BLD AUTO: 188 K/UL (ref 150–400)
PMV BLD AUTO: 10.7 FL (ref 8.9–12.9)
POTASSIUM SERPL-SCNC: 4.3 MMOL/L (ref 3.5–5.1)
PROTHROMBIN TIME: 10.2 SEC (ref 9–11.1)
RBC # BLD AUTO: 4.59 M/UL (ref 3.8–5.2)
SODIUM SERPL-SCNC: 138 MMOL/L (ref 136–145)
TRIGL SERPL-MCNC: 54 MG/DL
VLDLC SERPL CALC-MCNC: 10.8 MG/DL
WBC # BLD AUTO: 3.6 K/UL (ref 3.6–11)

## 2024-03-22 PROCEDURE — 99213 OFFICE O/P EST LOW 20 MIN: CPT | Performed by: INTERNAL MEDICINE

## 2024-03-22 PROCEDURE — 93005 ELECTROCARDIOGRAM TRACING: CPT | Performed by: INTERNAL MEDICINE

## 2024-03-22 PROCEDURE — 93010 ELECTROCARDIOGRAM REPORT: CPT | Performed by: INTERNAL MEDICINE

## 2024-03-22 PROCEDURE — 1123F ACP DISCUSS/DSCN MKR DOCD: CPT | Performed by: INTERNAL MEDICINE

## 2024-03-22 ASSESSMENT — PATIENT HEALTH QUESTIONNAIRE - PHQ9
1. LITTLE INTEREST OR PLEASURE IN DOING THINGS: NOT AT ALL
SUM OF ALL RESPONSES TO PHQ QUESTIONS 1-9: 0
SUM OF ALL RESPONSES TO PHQ QUESTIONS 1-9: 0
SUM OF ALL RESPONSES TO PHQ9 QUESTIONS 1 & 2: 0
SUM OF ALL RESPONSES TO PHQ QUESTIONS 1-9: 0
SUM OF ALL RESPONSES TO PHQ QUESTIONS 1-9: 0
2. FEELING DOWN, DEPRESSED OR HOPELESS: NOT AT ALL

## 2024-04-29 ENCOUNTER — OFFICE VISIT (OUTPATIENT)
Age: 69
End: 2024-04-29
Payer: MEDICARE

## 2024-04-29 ENCOUNTER — HOSPITAL ENCOUNTER (OUTPATIENT)
Facility: HOSPITAL | Age: 69
Discharge: HOME | End: 2024-05-02
Attending: INTERNAL MEDICINE
Payer: MEDICARE

## 2024-04-29 VITALS — WEIGHT: 136 LBS | BODY MASS INDEX: 23.34 KG/M2 | DIASTOLIC BLOOD PRESSURE: 86 MMHG | SYSTOLIC BLOOD PRESSURE: 138 MMHG

## 2024-04-29 DIAGNOSIS — M85.80 OSTEOPENIA AFTER MENOPAUSE: ICD-10-CM

## 2024-04-29 DIAGNOSIS — Z12.31 SCREENING MAMMOGRAM FOR HIGH-RISK PATIENT: ICD-10-CM

## 2024-04-29 DIAGNOSIS — Z01.419 ENCOUNTER FOR GYNECOLOGICAL EXAMINATION: Primary | ICD-10-CM

## 2024-04-29 DIAGNOSIS — Z78.0 OSTEOPENIA AFTER MENOPAUSE: ICD-10-CM

## 2024-04-29 DIAGNOSIS — N81.10 PROLAPSE OF ANTERIOR VAGINAL WALL: ICD-10-CM

## 2024-04-29 DIAGNOSIS — E28.39 ESTROGEN DEFICIENCY: ICD-10-CM

## 2024-04-29 PROCEDURE — 99397 PER PM REEVAL EST PAT 65+ YR: CPT | Performed by: OBSTETRICS & GYNECOLOGY

## 2024-04-29 PROCEDURE — 77063 BREAST TOMOSYNTHESIS BI: CPT

## 2024-04-29 NOTE — PROGRESS NOTES
Ariella Torres is a 69 y.o. female returns for an annual exam     Chief Complaint   Patient presents with    Annual Exam       No LMP recorded. Patient is postmenopausal.  Her periods are absent  Problems: no problems  Birth Control: post menopausal status.  Last Pap: see report obtained 1 year(s) ago.  She does not have a history of ALIYAH 2, 3 or cervical cancer.   Last Mammogram:  4/29/2024 .  It was see report.   Last Bone Density: see report obtained 2 year(s) ago.  Last colonoscopy: see report obtained 13 year(s) ago.      1. Have you been to the ER, urgent care clinic, or hospitalized since your last visit? No    2. Have you seen or consulted any other health care providers outside of the [facility] since your last visit? No    Examination chaperoned by Indy Osullivan LPN.  
y.o.  for annual gynecologic exam.  Encounter Diagnoses   Name Primary?    Encounter for gynecological examination Yes    Prolapse of anterior vaginal wall     Estrogen deficiency     Osteopenia after menopause        Plan:  Recommend follow up one year for routine annual gynecologic exam or sooner as needed for any GYN concerns.  Patient should follow up with a primary care physician for chronic medical problems and evaluation of non-gynecologic concerns.  STD testing recommended per CDC guidelines and at patient request.   Follow up: annual gynecologic exam one year.  Disc options for prolapse: ho give re SCP/surgery option  Rec avoid strain  Disc option of different pessary shape/size   BMD      Folllow up:  [x] return for annual well woman exam in one year or sooner if the patient is having problems  [] follow up and ultrasound  [] 6 months  [] 3 months  [] 6 weeks   [] 1 month  [x] 1 year: WWE    No orders of the defined types were placed in this encounter.        A pelvic exam and associated supplies were necessary for evaluation of the patient for this type of visit.      Provider chaperoned and assisted by: Ilda Gómez MA

## 2025-04-08 ENCOUNTER — ANESTHESIA EVENT (OUTPATIENT)
Facility: HOSPITAL | Age: 70
End: 2025-04-08
Payer: MEDICARE

## 2025-04-08 ENCOUNTER — HOSPITAL ENCOUNTER (OUTPATIENT)
Facility: HOSPITAL | Age: 70
Setting detail: OUTPATIENT SURGERY
Discharge: HOME | End: 2025-04-08
Attending: SPECIALIST | Admitting: SPECIALIST
Payer: MEDICARE

## 2025-04-08 ENCOUNTER — ANESTHESIA (OUTPATIENT)
Facility: HOSPITAL | Age: 70
End: 2025-04-08
Payer: MEDICARE

## 2025-04-08 VITALS
OXYGEN SATURATION: 100 % | HEIGHT: 65 IN | HEART RATE: 61 BPM | RESPIRATION RATE: 15 BRPM | DIASTOLIC BLOOD PRESSURE: 74 MMHG | WEIGHT: 134.48 LBS | SYSTOLIC BLOOD PRESSURE: 130 MMHG | BODY MASS INDEX: 22.41 KG/M2 | TEMPERATURE: 97.6 F

## 2025-04-08 PROCEDURE — 6360000002 HC RX W HCPCS: Performed by: NURSE ANESTHETIST, CERTIFIED REGISTERED

## 2025-04-08 PROCEDURE — 3600007512: Performed by: SPECIALIST

## 2025-04-08 PROCEDURE — 3600007502: Performed by: SPECIALIST

## 2025-04-08 PROCEDURE — 7100000010 HC PHASE II RECOVERY - FIRST 15 MIN: Performed by: SPECIALIST

## 2025-04-08 PROCEDURE — 3700000000 HC ANESTHESIA ATTENDED CARE: Performed by: SPECIALIST

## 2025-04-08 PROCEDURE — 7100000011 HC PHASE II RECOVERY - ADDTL 15 MIN: Performed by: SPECIALIST

## 2025-04-08 PROCEDURE — 2580000003 HC RX 258: Performed by: SPECIALIST

## 2025-04-08 PROCEDURE — 3700000001 HC ADD 15 MINUTES (ANESTHESIA): Performed by: SPECIALIST

## 2025-04-08 RX ORDER — SIMETHICONE 40MG/0.6ML
40 SUSPENSION, DROPS(FINAL DOSAGE FORM)(ML) ORAL AS NEEDED
Status: DISCONTINUED | OUTPATIENT
Start: 2025-04-08 | End: 2025-04-08 | Stop reason: HOSPADM

## 2025-04-08 RX ORDER — PROPOFOL 10 MG/ML
INJECTION, EMULSION INTRAVENOUS
Status: DISCONTINUED | OUTPATIENT
Start: 2025-04-08 | End: 2025-04-08 | Stop reason: SDUPTHER

## 2025-04-08 RX ORDER — SODIUM CHLORIDE 0.9 % (FLUSH) 0.9 %
5-40 SYRINGE (ML) INJECTION PRN
Status: DISCONTINUED | OUTPATIENT
Start: 2025-04-08 | End: 2025-04-08 | Stop reason: HOSPADM

## 2025-04-08 RX ORDER — SODIUM CHLORIDE 9 MG/ML
INJECTION, SOLUTION INTRAVENOUS CONTINUOUS
Status: DISCONTINUED | OUTPATIENT
Start: 2025-04-08 | End: 2025-04-08 | Stop reason: HOSPADM

## 2025-04-08 RX ADMIN — PROPOFOL 120 MCG/KG/MIN: 10 INJECTION, EMULSION INTRAVENOUS at 09:20

## 2025-04-08 RX ADMIN — SODIUM CHLORIDE: 9 INJECTION, SOLUTION INTRAVENOUS at 09:01

## 2025-04-08 RX ADMIN — LIDOCAINE HYDROCHLORIDE 50 MG: 20 INJECTION, SOLUTION INFILTRATION; PERINEURAL at 09:20

## 2025-04-08 RX ADMIN — PROPOFOL 100 MG: 10 INJECTION, EMULSION INTRAVENOUS at 09:21

## 2025-04-08 ASSESSMENT — PAIN SCALES - GENERAL
PAINLEVEL_OUTOF10: 0

## 2025-04-08 ASSESSMENT — PAIN - FUNCTIONAL ASSESSMENT: PAIN_FUNCTIONAL_ASSESSMENT: 0-10

## 2025-04-08 NOTE — OP NOTE
change, or luminal narrowing.      Specimens:    none    Complications:   None; patient tolerated the procedure well.    Impression:  Otherwise normal colonoscopy to the cecum    Recommendations:     - Repeat colonoscopy in 5 years.    Thank you for entrusting me with this patient's care.  Please do not hesitate to contact me with any questions or if I can be of assistance with any of your other patients' GI needs.    Signed By: Enoc Suarez MD                        April 8, 2025      Surgical assistant none.  Implants none unless specified.

## 2025-04-08 NOTE — ANESTHESIA POSTPROCEDURE EVALUATION
Department of Anesthesiology  Postprocedure Note    Patient: Ariella Torres  MRN: [id number]  YOB: 1955  Date of evaluation: 4/8/2025    Procedure Summary       Date: 04/08/25 Room / Location: [facility] 03 / [facility] ENDOSCOPY    Anesthesia Start: 0915 Anesthesia Stop: 0945    Procedure: COLONOSCOPY (Lower GI Region) Diagnosis:       Family history of colon cancer      Personal history of colon polyps, unspecified      (Family history of colon cancer [Z80.0])      (Personal history of colon polyps, unspecified [Z86.0100])    Surgeons: Enoc Suarez MD Responsible Provider: Gage Zavala MD    Anesthesia Type: TIVA, MAC ASA Status: 1            Anesthesia Type: No value filed.    Cynthia Phase I: Cynthia Score: 10    Cyntiha Phase II: Cynthia Score: 10    Anesthesia Post Evaluation    No notable events documented.

## 2025-04-08 NOTE — DISCHARGE INSTRUCTIONS
let me or my office staff know if you have any feedback about today's procedure.    Henry Suarez MD    Colonoscopy saves lives, and can prevent colon cancer.  Everyone aged 50 or older needs colonoscopy.  Tell your family and friends: get the test!

## 2025-04-08 NOTE — H&P
69 y.o. female for open access colonoscopy for screening   Additional data for completion of the targeted pre-endoscopy H&P will be provided under 'H&P interval notes'.  Please see that document which will be attached to this.  Enoc Suarez MD    FHX CRC last colon [place] 2019 no neoplasia noted.  
  Resp:    Temp:    SpO2:      Gen: Appears comfortable, no distress.  Pulm: comfortable respirations with no abnormal audible breath sounds  HEART: well perfused, no abnormal audible heart sounds  GI: abdomen flat.    PLAN:  Informed consent discussion held, patient afforded an opportunity to ask questions and all questions answered.  After being advised of the risks, benefits, and alternatives, the patient requested that we proceed and indicated so on a written consent form.      Will proceed with procedure as planned.  Enoc Suarez MD

## 2025-04-08 NOTE — ANESTHESIA PRE PROCEDURE
results found for: \"PREGTESTUR\", \"PREGSERUM\", \"HCG\", \"HCGQUANT\"     ABGs: No results found for: \"PHART\", \"PO2ART\", \"CPM0IHP\", \"HAO9GGF\", \"BEART\", \"P5FHWEIH\"     Type & Screen (If Applicable):  No results found for: \"ABORH\", \"LABANTI\"    Drug/Infectious Status (If Applicable):  No results found for: \"HIV\", \"HEPCAB\"    COVID-19 Screening (If Applicable): No results found for: \"COVID19\"        Anesthesia Evaluation  Patient summary reviewed and Nursing notes reviewed  Airway: Mallampati: II  TM distance: >3 FB   Neck ROM: full  Mouth opening: > = 3 FB   Dental: normal exam         Pulmonary:Negative Pulmonary ROS and normal exam  breath sounds clear to auscultation                             Cardiovascular:Negative CV ROS  Exercise tolerance: good (>4 METS)          Rhythm: regular  Rate: normal                    Neuro/Psych:   Negative Neuro/Psych ROS              GI/Hepatic/Renal: Neg GI/Hepatic/Renal ROS            Endo/Other: Negative Endo/Other ROS                    Abdominal:             Vascular: negative vascular ROS.         Other Findings:             Anesthesia Plan      TIVA and MAC     ASA 1       Induction: intravenous.      Anesthetic plan and risks discussed with patient.        Attending anesthesiologist reviewed and agrees with Preprocedure content                Gage Zavala MD   4/8/2025

## 2025-04-08 NOTE — PERIOP NOTE
Endoscope was pre-cleaned at bedside immediately following procedure by Ignacio. Assumed pt care taken to recovery via stretcher for further monitoring please see CRNA chart sedation/monitoring for procedure pt tolerated well.

## 2025-04-29 SDOH — ECONOMIC STABILITY: INCOME INSECURITY: IN THE LAST 12 MONTHS, WAS THERE A TIME WHEN YOU WERE NOT ABLE TO PAY THE MORTGAGE OR RENT ON TIME?: NO

## 2025-04-29 SDOH — ECONOMIC STABILITY: TRANSPORTATION INSECURITY
IN THE PAST 12 MONTHS, HAS THE LACK OF TRANSPORTATION KEPT YOU FROM MEDICAL APPOINTMENTS OR FROM GETTING MEDICATIONS?: NO

## 2025-04-29 SDOH — ECONOMIC STABILITY: FOOD INSECURITY: WITHIN THE PAST 12 MONTHS, YOU WORRIED THAT YOUR FOOD WOULD RUN OUT BEFORE YOU GOT MONEY TO BUY MORE.: NEVER TRUE

## 2025-04-29 SDOH — ECONOMIC STABILITY: FOOD INSECURITY: WITHIN THE PAST 12 MONTHS, THE FOOD YOU BOUGHT JUST DIDN'T LAST AND YOU DIDN'T HAVE MONEY TO GET MORE.: NEVER TRUE

## 2025-04-29 SDOH — ECONOMIC STABILITY: TRANSPORTATION INSECURITY
IN THE PAST 12 MONTHS, HAS LACK OF TRANSPORTATION KEPT YOU FROM MEETINGS, WORK, OR FROM GETTING THINGS NEEDED FOR DAILY LIVING?: NO

## 2025-04-29 ASSESSMENT — PATIENT HEALTH QUESTIONNAIRE - PHQ9
SUM OF ALL RESPONSES TO PHQ QUESTIONS 1-9: 0
1. LITTLE INTEREST OR PLEASURE IN DOING THINGS: NOT AT ALL
SUM OF ALL RESPONSES TO PHQ QUESTIONS 1-9: 0
2. FEELING DOWN, DEPRESSED OR HOPELESS: NOT AT ALL
2. FEELING DOWN, DEPRESSED OR HOPELESS: NOT AT ALL
SUM OF ALL RESPONSES TO PHQ QUESTIONS 1-9: 0
SUM OF ALL RESPONSES TO PHQ QUESTIONS 1-9: 0
1. LITTLE INTEREST OR PLEASURE IN DOING THINGS: NOT AT ALL
SUM OF ALL RESPONSES TO PHQ9 QUESTIONS 1 & 2: 0

## 2025-04-30 ENCOUNTER — HOSPITAL ENCOUNTER (OUTPATIENT)
Facility: HOSPITAL | Age: 70
Discharge: HOME | End: 2025-05-03
Attending: INTERNAL MEDICINE
Payer: MEDICARE

## 2025-04-30 ENCOUNTER — OFFICE VISIT (OUTPATIENT)
Age: 70
End: 2025-04-30
Payer: MEDICARE

## 2025-04-30 VITALS
DIASTOLIC BLOOD PRESSURE: 67 MMHG | SYSTOLIC BLOOD PRESSURE: 136 MMHG | RESPIRATION RATE: 18 BRPM | BODY MASS INDEX: 22.48 KG/M2 | HEART RATE: 72 BPM | WEIGHT: 133 LBS

## 2025-04-30 VITALS — BODY MASS INDEX: 22.41 KG/M2 | WEIGHT: 134.48 LBS | HEIGHT: 65 IN

## 2025-04-30 DIAGNOSIS — Z12.31 VISIT FOR SCREENING MAMMOGRAM: ICD-10-CM

## 2025-04-30 DIAGNOSIS — Z01.419 ENCOUNTER FOR WELL WOMAN EXAM WITH ROUTINE GYNECOLOGICAL EXAM: Primary | ICD-10-CM

## 2025-04-30 PROCEDURE — 1160F RVW MEDS BY RX/DR IN RCRD: CPT | Performed by: OBSTETRICS & GYNECOLOGY

## 2025-04-30 PROCEDURE — 77063 BREAST TOMOSYNTHESIS BI: CPT

## 2025-04-30 PROCEDURE — 1159F MED LIST DOCD IN RCRD: CPT | Performed by: OBSTETRICS & GYNECOLOGY

## 2025-04-30 PROCEDURE — 99397 PER PM REEVAL EST PAT 65+ YR: CPT | Performed by: OBSTETRICS & GYNECOLOGY

## 2025-04-30 NOTE — PROGRESS NOTES
Ariella Torres is a 70 y.o. female returns for an annual exam     Chief Complaint   Patient presents with    Annual Exam       No LMP recorded. Patient is postmenopausal.  Problems: no problems  Birth Control: post menopausal status.  Last Pap: normal obtained 2 year(s) ago.  She does not have a history of ALIYAH 2, 3 or cervical cancer.   Last Mammogram:  had hers downstairs today in main radiology . .   Last Bone Density: abnormal obtained 3 year(s) ago.  Last colonoscopy: normal obtained this month.  She also has a pessary that another doctor takes care of (dr armando CHRISTINA)          Examination chaperoned by TERRELL BILLINGS RN.  
pain, muscle pain  Breast: negative for breast lumps, nipple discharge, galactorrhea  Skin :negative for itching, rash, hives  Neuro: negative for dizziness, headache, confusion, weakness  Psych: negative for anxiety, depression, change in mood  Heme/lymph: negative for bleeding, bruising, pallor    Physical Exam    /67   Pulse 72   Resp 18   Wt 60.3 kg (133 lb)   BMI 22.48 kg/m²     Constitutional  Appearance: well-nourished, well developed, alert, in no acute distress    HENT  Head and Face: appears normal    Neck  Inspection/Palpation: normal appearance, no masses or tenderness  Lymph Nodes: no lymphadenopathy present  Thyroid: gland size normal, nontender, no nodules or masses present on palpation    Chest  Respiratory Effort: breathing unlabored    Breasts  Inspection of Breasts: breasts symmetrical, no skin changes, no discharge present, nipple appearance normal, no skin retraction present  Palpation of Breasts and Axillae: no masses present on palpation, no breast tenderness  Axillary Lymph Nodes: no lymphadenopathy present    Gastrointestinal  Abdominal Examination: abdomen non-tender to palpation, normal bowel sounds, no masses present  Liver and spleen: no hepatomegaly present, spleen not palpable  Hernias: no hernias identified    Genitourinary  External Genitalia: normal appearance for age, no discharge present, no tenderness present, no inflammatory lesions present, no masses present, no atrophy present  Vagina: normal vaginal vault without central or paravaginal defects, no discharge present, no inflammatory lesions present, no masses present  Bladder: non-tender to palpation  Urethra: appears normal  Cervix: normal   Uterus: normal size, shape and consistency  Adnexa: no adnexal tenderness present, no adnexal masses present  Perineum: perineum within normal limits, no evidence of trauma, no rashes or skin lesions present  Anus: anus within normal limits, no hemorrhoids present  Inguinal

## 2025-07-08 ENCOUNTER — PATIENT MESSAGE (OUTPATIENT)
Age: 70
End: 2025-07-08

## 2025-07-08 DIAGNOSIS — E28.39 ESTROGEN DEFICIENCY: Primary | ICD-10-CM
